# Patient Record
Sex: MALE | Race: WHITE | NOT HISPANIC OR LATINO | Employment: OTHER | URBAN - METROPOLITAN AREA
[De-identification: names, ages, dates, MRNs, and addresses within clinical notes are randomized per-mention and may not be internally consistent; named-entity substitution may affect disease eponyms.]

---

## 2017-02-13 DIAGNOSIS — M19.90 ARTHRITIS: ICD-10-CM

## 2017-02-13 RX ORDER — CELECOXIB 200 MG/1
CAPSULE ORAL
Qty: 90 CAPSULE | Refills: 1 | Status: SHIPPED | OUTPATIENT
Start: 2017-02-13 | End: 2017-08-28 | Stop reason: SDUPTHER

## 2017-02-15 RX ORDER — HYDROCHLOROTHIAZIDE 12.5 MG/1
TABLET ORAL
Qty: 90 TABLET | Refills: 1 | Status: SHIPPED | OUTPATIENT
Start: 2017-02-15 | End: 2017-08-09 | Stop reason: ALTCHOICE

## 2017-03-31 ENCOUNTER — TELEPHONE (OUTPATIENT)
Dept: FAMILY MEDICINE CLINIC | Facility: CLINIC | Age: 57
End: 2017-03-31

## 2017-03-31 NOTE — TELEPHONE ENCOUNTER
"Spoke with patient and informed him we had no documentation from [facility] and that he would need an appointment for any refills since he hasn;t been seen since 04/2016.  Patient states he is very upset, he knows those phone calls were made and its probably too late for medication refills anyways. He said there was nothing more to discuss and our conversation was done.    ----- Message from Jacob Collazo sent at 3/31/2017  9:18 AM EDT -----  [phone]    PT IS VERY AGGRAVATED, HIS EXACT WORDS WERE \"THIS IS TOTALLY UNACCEPTABLE!\"     [facility] TRIED CONTACTING US TWICE ON 3/10 & 3/15    PT HAS WAITED THREE WEEKS NOW ON A REFILL, HE SAID WE HAVE FAILED TO DO IT. HE'S ASKING IF SOMEONE CAN PLEASE CALL HIM BACK     "

## 2017-08-03 ENCOUNTER — TELEPHONE (OUTPATIENT)
Dept: PAIN MEDICINE | Facility: CLINIC | Age: 57
End: 2017-08-03

## 2017-08-03 NOTE — TELEPHONE ENCOUNTER
"07-, 09-: PRP Right L5-S1 and right S1 transforaminal epidural approach  04- Right L5-S1 and right S1 transforaminal epidural steroid   COPY FORWARD    Spoke to Mr. Munoz.  He underwent PRP L5-S1 in July and September 2016.  He states that he had slight relief of his back and leg pain but is unsure how long before he began to have regularly occurring pain.  He says he would ra  He describes the pain as escalating from 2-3/10 to 10/10 very quickly, and usually occurring about midday. Pain is located in the low back and right hip and _______ (lat,post, ant thigh/LE, foot) The pain is brought on by activity, and sitting will also make it worse.  He finds that lying flat will alleviate the pain, sometimes after a few minutes, sometimes it takes longer.  Usually he can make it til bedtime without another episode of severe pain.  He says that his Pilates program is what is keeping him going at this time.      He takes celebrex and uses dicofenac cream which he doesn't think helps.  He no longer takes the Keppra or Buspar because of side effect of \"knocking him out\".  He also sparingly uses 1/2 - 1 tablet  tizanidine at HS because of sedation side effect.  "

## 2017-08-09 ENCOUNTER — OFFICE VISIT (OUTPATIENT)
Dept: FAMILY MEDICINE CLINIC | Facility: CLINIC | Age: 57
End: 2017-08-09

## 2017-08-09 VITALS
BODY MASS INDEX: 27.69 KG/M2 | WEIGHT: 193 LBS | HEART RATE: 76 BPM | DIASTOLIC BLOOD PRESSURE: 108 MMHG | SYSTOLIC BLOOD PRESSURE: 138 MMHG | TEMPERATURE: 98.5 F

## 2017-08-09 DIAGNOSIS — I10 ESSENTIAL HYPERTENSION: Primary | ICD-10-CM

## 2017-08-09 LAB
ANION GAP SERPL CALCULATED.3IONS-SCNC: 10 MMOL/L (ref 3–11)
BUN BLD-MCNC: 20 MG/DL (ref 9–23)
BUN/CREAT SERPL: 25 (ref 7–25)
CALCIUM SPEC-SCNC: 9.5 MG/DL (ref 8.7–10.4)
CHLORIDE SERPL-SCNC: 101 MMOL/L (ref 99–109)
CO2 SERPL-SCNC: 26 MMOL/L (ref 20–31)
CREAT BLD-MCNC: 0.8 MG/DL (ref 0.6–1.3)
GFR SERPL CREATININE-BSD FRML MDRD: 100 ML/MIN/1.73
GLUCOSE BLD-MCNC: 106 MG/DL (ref 70–100)
POTASSIUM BLD-SCNC: 4.4 MMOL/L (ref 3.5–5.5)
SODIUM BLD-SCNC: 137 MMOL/L (ref 132–146)
TSH SERPL DL<=0.05 MIU/L-ACNC: 2.17 MIU/ML (ref 0.35–5.35)

## 2017-08-09 PROCEDURE — 36415 COLL VENOUS BLD VENIPUNCTURE: CPT | Performed by: FAMILY MEDICINE

## 2017-08-09 PROCEDURE — 80048 BASIC METABOLIC PNL TOTAL CA: CPT | Performed by: FAMILY MEDICINE

## 2017-08-09 PROCEDURE — 99213 OFFICE O/P EST LOW 20 MIN: CPT | Performed by: FAMILY MEDICINE

## 2017-08-09 PROCEDURE — 84443 ASSAY THYROID STIM HORMONE: CPT | Performed by: FAMILY MEDICINE

## 2017-08-09 RX ORDER — CHLORTHALIDONE 25 MG/1
25 TABLET ORAL DAILY
Qty: 90 TABLET | Refills: 3 | Status: SHIPPED | OUTPATIENT
Start: 2017-08-09 | End: 2017-08-14

## 2017-08-09 RX ORDER — CHLORTHALIDONE 25 MG/1
25 TABLET ORAL DAILY
Qty: 30 TABLET | Refills: 3 | Status: SHIPPED | OUTPATIENT
Start: 2017-08-09 | End: 2017-08-09 | Stop reason: SDUPTHER

## 2017-08-09 NOTE — PROGRESS NOTES
Subjective   Abel Munoz is a 57 y.o. male.     History of Present Illness   Living in [place].  Home blood pressure dystolic running high 90 to 100.    Back discomfort helped with exercise and using a .  Plays golf regularly. Still right leg pain but deals with it and avoids medication.  On Celebrex five years.  Some swelling of feet during day.  No muscle cramps.  Sleeps reasonably well.   The following portions of the patient's history were reviewed and updated as appropriate: allergies, current medications, past family history, past medical history, past social history, past surgical history and problem list.    Review of Systems   Constitutional: Negative.    HENT: Negative.    Eyes: Negative.    Respiratory: Negative.    Cardiovascular: Negative.    Gastrointestinal: Negative.    Endocrine: Negative.    Genitourinary: Negative.    Musculoskeletal: Negative.    Skin: Negative.    Allergic/Immunologic: Negative.    Neurological: Negative.    Hematological: Negative.    Psychiatric/Behavioral: Negative.        Objective   Physical Exam   Constitutional: He appears well-developed.   Neck: Neck supple. No thyromegaly present.   Cardiovascular: Regular rhythm and normal heart sounds.    Pulmonary/Chest: Effort normal and breath sounds normal.   Musculoskeletal: He exhibits no edema.   Neurological: He is alert.   Vitals reviewed.      Assessment/Plan   Abel was seen today for follow-up.    Diagnoses and all orders for this visit:    Essential hypertension  -     Discontinue: chlorthalidone (HYGROTON) 25 MG tablet; Take 1 tablet by mouth Daily.  -     chlorthalidone (HYGROTON) 25 MG tablet; Take 1 tablet by mouth Daily.  -     Basic Metabolic Panel  -     TSH    stop HCTZ, use chlorthalidone for swelling and better dystolic control.

## 2017-08-14 ENCOUNTER — OFFICE VISIT (OUTPATIENT)
Dept: PAIN MEDICINE | Facility: CLINIC | Age: 57
End: 2017-08-14

## 2017-08-14 VITALS
WEIGHT: 193 LBS | HEART RATE: 68 BPM | OXYGEN SATURATION: 99 % | BODY MASS INDEX: 27.63 KG/M2 | DIASTOLIC BLOOD PRESSURE: 83 MMHG | TEMPERATURE: 98 F | RESPIRATION RATE: 18 BRPM | HEIGHT: 70 IN | SYSTOLIC BLOOD PRESSURE: 126 MMHG

## 2017-08-14 DIAGNOSIS — M47.812 FACET ARTHROPATHY, CERVICAL: ICD-10-CM

## 2017-08-14 DIAGNOSIS — Z98.1 HISTORY OF FUSION OF CERVICAL SPINE: ICD-10-CM

## 2017-08-14 DIAGNOSIS — M51.26 LUMBAR DISC HERNIATION: ICD-10-CM

## 2017-08-14 DIAGNOSIS — M48.00 NEUROFORAMINAL STENOSIS OF SPINE: ICD-10-CM

## 2017-08-14 DIAGNOSIS — G47.00 INSOMNIA, UNSPECIFIED TYPE: ICD-10-CM

## 2017-08-14 DIAGNOSIS — M47.814 FACET ARTHROPATHY, THORACIC: ICD-10-CM

## 2017-08-14 DIAGNOSIS — M48.061 NEUROFORAMINAL STENOSIS OF LUMBAR SPINE: ICD-10-CM

## 2017-08-14 DIAGNOSIS — M47.26 OSTEOARTHRITIS OF SPINE WITH RADICULOPATHY, LUMBAR REGION: ICD-10-CM

## 2017-08-14 DIAGNOSIS — F41.9 ANXIETY: ICD-10-CM

## 2017-08-14 DIAGNOSIS — G62.9 PERIPHERAL POLYNEUROPATHY: ICD-10-CM

## 2017-08-14 DIAGNOSIS — M47.26 OSTEOARTHRITIS OF SPINE WITH RADICULOPATHY, LUMBAR REGION: Primary | ICD-10-CM

## 2017-08-14 PROCEDURE — 99214 OFFICE O/P EST MOD 30 MIN: CPT | Performed by: ANESTHESIOLOGY

## 2017-08-14 NOTE — PROGRESS NOTES
"Chief Complaint: \"I have pain in my right leg into the right foot. I don't have lower back pain.\"    Brief History: Mr. Munoz returns for evaluation of recurrent right lower extremity pain. He has a known history of right L5-S1 stenosis. He underwent platelet rich therapy right L5-S1 and right S1 transforaminal epidural approach on 07/06/2016, and experienced significant pain relief up to the point that he was almost pain-free.    On 09-, as per patient's request, he underwent PRP Right L5-S1 and right S1 transforaminal epidural approach. That was his last visit with me because of his winter relocation to [place]. Patient does not recall the amount of relief after his last procedure. Patient was able to play golf although with difficulties.    Pain level: 5/10.    Pain level ranges from 0-2/10 to 10/10 (at the end of the day)  Pain is localized from the right hip down to the right foot.   Patient denies numbness or weakness in the lower extremities, or any new bladder lower problems.   Patient has declined the possibility of surgical intervention.    In addition, patient is status post ACDF. He has atrophy of the left rhomboids and scapular winging. He denies any pain in his upper extremity except for pain in the left shoulder that at times radiates up to above the shoulder level.  He denies neck pain.  Patient is unable to identify factors that may increase or decrease his pain.  Patient denies difficulties with his balance.    Pharmacological measures: celocoxib 200 mg daily and tizanidine without side effects.    Patient continues Pilates and a home exercise program.   I have reviewed [facility]#[id number] consistent with medication reconciliation.    Diagnostic Studies  MRI lumbar spine, [facility] [place]: 03/04/2016: Severe right L5-S1 neural foraminal narrowing with impingement on the dorsal root ganglion. There is edema and enhancement of the exiting L5 nerve root. Right L5-S1 facet joint inflammation. " No canal stenosis.  X-rays of the thoracic spine 07/16/2015, revealed degenerative changes.  MRI of the lumbar spine without contrast revealed lateral disc bulge on the left creating severe narrowing of the left neural foramina and contact of the left nerve root. Mild retrolisthesis of L2 on L3. L1-L2 right lateral disc protrusion creating narrowing of the right neural foramina and contact of the right nerve root. L3-L4 broad-based disc bulge creating narrowing of the neural foramina bilaterally, left greater than right. Mild narrowing of the spinal canal. L4-L5 broad-based disc bulge creating narrowing of the neural foramina bilaterally. No significant spinal canal stenosis. L5-S1 broad-based disc bulge creating narrowing of the neural foramina bilaterally with no significant nerve root compression or contact.   EMG/NCV of the bilateral lower extremities revealed mild predominantly demyelinating sensorimotor peripheral neuropathy. Etiology unclear. Patient has history of heavy alcohol intake.  X-ray of the lumbar spine on 6/18/2014 obtained and read by Dr. En Burgos in the office, revealed multilevel spondylosis, mild scoliosis. The arms are not available for review.  MRI of the thoracic spine without contrast on 1/18/2007 revealed mild S tight thoracic scoliosis. Degenerative disc disease in the lower cervical spine, primarily at the C6-C7 level. Very mild degenerative changes in the thoracic spine. Small anterior spurs. Tiny posterior spur at T2-T3.  X-ray Cervical flexion and extension, 03/17/2015: Unremarkable findings.  MRI of the cervical spine without contrast on December 28, 2013 revealed cervical spondylosis without significant canal or neuroforaminal stenosis. C3-C4 small disc osteophyte complex without neural foraminal or central stenosis area. C4-C5 disc osteophyte complex formation asymmetrically greater on the right side, which does efface the right cord surface. Minimal narrowing of the neural  "foramina bilaterally. There is mild central canal stenosis. C5-C6 disc osteophyte complex formation, asymmetrically greater on the right, causing mild effacement of the ventral and right paracentral cord so surface, resulting in mild canal stenosis. C6-C7 vertebral body fusion. Posterior osteophyte formation which does abut and efface the left ventral cord surface. Neural foramina appear patent.    Review of Systems   Cardiovascular: Positive for leg swelling.   Musculoskeletal: Positive for arthralgias, back pain and gait problem.   All other systems reviewed and are negative.     The following portions of the patient's history were reviewed and updated as appropriate: problem list, past medical history, past surgery history, social history, family history, medications, and allergies     /83 (BP Location: Left arm, Patient Position: Sitting)  Pulse 68  Temp 98 °F (36.7 °C) (Temporal Artery )   Resp 18  Ht 70\" (177.8 cm)  Wt 193 lb (87.5 kg)  SpO2 99%  BMI 27.69 kg/m2     Physical Exam   Neurologic Exam   Constitutional: General appearance: No acute distress, well appearing and well nourished. Appears healthy, within normal limits of ideal weight, well hydrated and appearance reflects stated age.   Head and Face Normal. Palpation of the face and sinuses: No sinus tenderness.   Eyes Conjunctiva and lids: No erythema, swelling or discharge. Pupils and irises: Equal, round, reactive to light.   Neck: Supple, symmetric, trachea midline, no masses.   Pulmonary Respiratory effort: No increased work of breathing or signs of respiratory distress. Auscultation of lungs: Clear to auscultation.   Cardiovascular Auscultation of heart: Normal rate and rhythm, normal S1 and S2, no murmurs. Peripheral vascular exam: Normal. Carotid: right 2+, left 2+, no bruit on the right and no bruit on the left. Femoral: right 2+, left 2+, no bruit on the right and no bruit on the left. Posterior tibialis: right 2+ and left 2+. " Dorsalis pedis: right 2+ and left 2+. Abdominal Aorta: no abnormalities and no bruit heard. No pulse delay and no pulse discrepancy. Peripheral Pulses: full. Examination of extremities for edema and/or varicosities: Normal. No edema.   Abdomen Non-tender, no masses. Bowel sounds were normal. The abdomen was soft and nontender. No masses palpated.   Musculoskeletal Gait and station: Normal. Gait evaluation demonstrated a normal gait. Active range of motion of the cervical spine is remarkably improved. Extension, lateral flexion, rotation of the cervical spine did not increase or reproduce pain. Cervical facet joint loading maneuvers are negative at this time. Palpation of the left levator scapulae reveals trigger points.  This atrophy of the left rhomboids with scapular winging.  The range of motion of the glenohumeral joints is full and without pain. Rotator cuff strength is 5/5. Thoracic facet joint loading maneuvers are negative. The range of motion of the lumbar spine is full and without pain. Lumbar facet joint loading maneuvers are negative. Mo and Gaenslen's tests are negative. The range of motion of the hip joints is full and without pain. Piriformis maneuvers are negative. Muscle strength/tone: Normal. Motor Strength Findings: normal strength. Motor Tone: normal tone. Involuntary movements: none.   Skin and subcutaneous tissue: Normal without rashes or lesions.   Neurologic   Cranial nerves: Cranial nerves 2-12 intact.   Cortical function: Normal mental status.   Reflexes: 2+ and symmetric. Deep tendon reflexes: 0 right patella, 0 left patella, 0 right ankle jerk and 0 left ankle jerk2+ right biceps and 2+ left biceps. Superficial/Primitive Reflexes: primitive reflexes were absent. Spurling sign is negative. Lhermitte sign is negative. Straight leg raising test is negative. Femoral stretch sign is negative.   Sensation: No sensory loss. Sensory exam: intact to light touch, intact pain and temperature  sensation, intact vibration sensation and normal proprioception. There is decreased sensation to light touch distal to the knees. Vibration is impaired in the same distribution. Proprioception is intact.   Coordination: Normal finger to nose and heel to shin. Coordination: normal balance and negative Romberg's sign.   Psychiatric   Judgment and insight: Normal.   Orientation to person, place and time: Normal.   Recent and remote memory: Intact.   Mood and affect: Normal.     ASSESSMENT:   1. Osteoarthritis of spine with radiculopathy, lumbar region.  Right L5-S1    2. Lumbar disc herniation    3. Neuroforaminal stenosis of spine, right L5-S1    4. Facet arthropathy, cervical    5. History of fusion of cervical spine, ACDF C6-C7    6. Facet arthropathy, thoracic    7. Peripheral polyneuropathy    8. Insomnia, unspecified type    9. Anxiety      PLAN:   1. Referral to Dr. Ash Avila for neurosurgical consultation  2. Long-term rehabilitation efforts:  A. Continue Pilates  B. Continue TENS unit  3. Interventional pain management measures: I have advised the patient to postpone any procedures until he undergoes neurosurgical consultation.   4. Pharmacological measures: Patient has declined additional pharmacological measures  a. Continue celecoxib 200 mg daily, as currently prescribed  b. Trial with mannitol 10%, capsaicin 0.001%, lidocaine 10%, prilocaine 2%, imipramine 3% cream, apply 1 to 2 grams of cream to the affected areas every 4 to 6 hours as needed.  5. The patient has been instructed to contact my office with any questions or difficulties. The patient understands the plan and agrees to proceed accordingly.      Patient Care Team:  Chavo Tuttle MD as PCP - General  Chavo Tuttle MD as PCP - Family Medicine  Pepe Peña MD as Consulting Physician (Pain Medicine)       No orders of the defined types were placed in this encounter.        No future appointments.      Pepe GASTELUM  MD [place]

## 2017-08-18 ENCOUNTER — OFFICE VISIT (OUTPATIENT)
Dept: NEUROSURGERY | Facility: CLINIC | Age: 57
End: 2017-08-18

## 2017-08-18 VITALS — BODY MASS INDEX: 27.2 KG/M2 | WEIGHT: 190 LBS | TEMPERATURE: 97.3 F | HEIGHT: 70 IN

## 2017-08-18 DIAGNOSIS — M51.36 DDD (DEGENERATIVE DISC DISEASE), LUMBAR: Primary | ICD-10-CM

## 2017-08-18 DIAGNOSIS — M43.10 ACQUIRED SPONDYLOLISTHESIS: ICD-10-CM

## 2017-08-18 DIAGNOSIS — M54.16 LUMBAR RADICULOPATHY: ICD-10-CM

## 2017-08-18 DIAGNOSIS — M47.816 FACET ARTHROPATHY, LUMBAR: ICD-10-CM

## 2017-08-18 DIAGNOSIS — M51.36 BULGING LUMBAR DISC: ICD-10-CM

## 2017-08-18 PROCEDURE — 99243 OFF/OP CNSLTJ NEW/EST LOW 30: CPT | Performed by: NEUROLOGICAL SURGERY

## 2017-08-18 NOTE — PROGRESS NOTES
Patient: Abel Munoz  : 1960    Primary Care Provider: Chavo Tuttle MD    Requesting Provider: Dr. Pepe Peña        History    Chief Complaint: Back and right leg pain.    History of Present Illness: Mr. Munoz is a 57-year-old  who began having back difficulties in .  This was sustained for several months and he had a number of epidural injections done by Dr. Peña and improved.  Since  however his had bothersome pain in his right hip that extends into the side of the right calf.  This is often episodic.  He is worse with protracted sitting, playing golf, or sometimes with standing too much.  He gets up every day and doesn't series of stretches and exercises for an hour and a half.  Usually by the end of the day his symptoms are intolerable.  He has no left leg symptoms.  He denies bowel or bladder dysfunction.  Sometimes when at the beach he finds that he can't walk for any distance and has to look for a place to sit down.    Review of Systems   Constitutional: Negative for activity change, appetite change, chills, diaphoresis, fatigue, fever and unexpected weight change.   HENT: Negative for congestion, dental problem, drooling, ear discharge, ear pain, facial swelling, hearing loss, mouth sores, nosebleeds, postnasal drip, rhinorrhea, sinus pressure, sneezing, sore throat, tinnitus, trouble swallowing and voice change.    Eyes: Negative for photophobia, pain, discharge, redness, itching and visual disturbance.   Respiratory: Negative for apnea, cough, choking, chest tightness, shortness of breath, wheezing and stridor.    Cardiovascular: Negative for chest pain, palpitations and leg swelling.   Gastrointestinal: Positive for abdominal pain and nausea. Negative for abdominal distention, anal bleeding, blood in stool, constipation, diarrhea, rectal pain and vomiting.   Musculoskeletal: Positive for arthralgias, back pain and myalgias. Negative for gait problem, joint  "swelling, neck pain and neck stiffness.   Skin: Negative for color change, pallor, rash and wound.   Allergic/Immunologic: Negative for environmental allergies, food allergies and immunocompromised state.   Neurological: Positive for dizziness. Negative for tremors, seizures, syncope, facial asymmetry, speech difficulty, weakness, light-headedness, numbness and headaches.   Hematological: Negative for adenopathy. Does not bruise/bleed easily.   Psychiatric/Behavioral: Positive for decreased concentration. Negative for agitation, behavioral problems, confusion, dysphoric mood, self-injury, sleep disturbance and suicidal ideas. The patient is not nervous/anxious and is not hyperactive.        The patient's past medical history, past surgical history, family history, and social history have been reviewed at length in the electronic medical record.    Physical Exam:   Temp 97.3 °F (36.3 °C)  Ht 70\" (177.8 cm)  Wt 190 lb (86.2 kg)  BMI 27.26 kg/m2  CONSTITUTIONAL: Patient is well-nourished, pleasant and appears stated age.  CV: Heart regular rate and rhythm without murmur, rub, or gallop.  PULMONARY: Lungs are clear to ascultation.  MUSCULOSKELETAL:  Straight leg raising is negative.  Mo's Sign is negative.  ROM in back normal.  Tenderness in the back to palpation is not observed.  NEUROLOGICAL:  Orientation, memory, attention span, language function, and cognition have been examined and are intact.  Strength is intact in the lower extremities to direct testing.  Muscle tone is normal throughout.  Station and gait are normal.  Sensation is intact to light touch testing throughout.  Deep tendon reflexes are 1+ and symmetrical.  Coordination is intact.      Medical Decision Making    Data Review:   MRI of the lumbar spine dated 3/4/16 from the [facility] [place] [facility] demonstrates some mild diffuse degenerative disc disease and facet arthropathy.  There is a low-grade listhesis of L4 on L5.  I don't see any plain films but a " report suggests that flexion extension views demonstrated no instability in 2015.    Diagnosis:   1.  Lumbar degenerative disc disease.  2.  Lumbar degenerative joint disease.  3.  L4-5 spondylolisthesis.    Treatment Options:   Some of the symptoms that the patient harbors are probably mechanical.  However he has a very well-defined set of L5 symptoms on the right.  I recommended lumbar CT myelography with upright images to see if there is any compromise of the L5 nerve root.  Further recommendations will then ensue.       Diagnosis Plan   1. DDD (degenerative disc disease), lumbar     2. Lumbar radiculopathy     3. Facet arthropathy, lumbar     4. Bulging lumbar disc     5. Acquired spondylolisthesis             I, Dr. Avila, personally performed the services described in the documentation, as scribed in my presence, and it is both accurate and complete.  Scribed for Ash Avila MD by Preston Hanson CMA on 08/18/2017 at 8:42 AM

## 2017-08-28 DIAGNOSIS — M19.90 ARTHRITIS: ICD-10-CM

## 2017-08-29 ENCOUNTER — HOSPITAL ENCOUNTER (OUTPATIENT)
Dept: GENERAL RADIOLOGY | Facility: HOSPITAL | Age: 57
Discharge: HOME | End: 2017-08-29

## 2017-08-29 ENCOUNTER — HOSPITAL ENCOUNTER (OUTPATIENT)
Dept: CT IMAGING | Facility: HOSPITAL | Age: 57
Discharge: HOME | End: 2017-08-29

## 2017-08-29 ENCOUNTER — HOSPITAL ENCOUNTER (OUTPATIENT)
Dept: GENERAL RADIOLOGY | Facility: HOSPITAL | Age: 57
Discharge: HOME | End: 2017-08-29
Attending: NEUROLOGICAL SURGERY | Admitting: NEUROLOGICAL SURGERY

## 2017-08-29 VITALS
HEART RATE: 66 BPM | BODY MASS INDEX: 26.83 KG/M2 | WEIGHT: 187.4 LBS | SYSTOLIC BLOOD PRESSURE: 127 MMHG | OXYGEN SATURATION: 99 % | HEIGHT: 70 IN | RESPIRATION RATE: 18 BRPM | DIASTOLIC BLOOD PRESSURE: 87 MMHG | TEMPERATURE: 97.7 F

## 2017-08-29 DIAGNOSIS — M54.16 LUMBAR RADICULOPATHY: ICD-10-CM

## 2017-08-29 DIAGNOSIS — M54.16 ACUTE LUMBAR RADICULOPATHY: ICD-10-CM

## 2017-08-29 PROCEDURE — 72120 X-RAY BEND ONLY L-S SPINE: CPT

## 2017-08-29 PROCEDURE — 0 IOPAMIDOL 41 % SOLUTION: Performed by: NEUROLOGICAL SURGERY

## 2017-08-29 PROCEDURE — 72265 MYELOGRAPHY L-S SPINE: CPT

## 2017-08-29 PROCEDURE — 72131 CT LUMBAR SPINE W/O DYE: CPT

## 2017-08-29 RX ORDER — LIDOCAINE HYDROCHLORIDE 10 MG/ML
5 INJECTION, SOLUTION INFILTRATION; PERINEURAL ONCE
Status: COMPLETED | OUTPATIENT
Start: 2017-08-29 | End: 2017-08-29

## 2017-08-29 RX ORDER — ONDANSETRON 4 MG/1
4 TABLET, FILM COATED ORAL ONCE AS NEEDED
Status: DISCONTINUED | OUTPATIENT
Start: 2017-08-29 | End: 2017-08-30 | Stop reason: HOSPADM

## 2017-08-29 RX ORDER — PROMETHAZINE HYDROCHLORIDE 25 MG/1
25 TABLET ORAL ONCE AS NEEDED
Status: DISCONTINUED | OUTPATIENT
Start: 2017-08-29 | End: 2017-08-30 | Stop reason: HOSPADM

## 2017-08-29 RX ORDER — PROMETHAZINE HYDROCHLORIDE 25 MG/ML
25 INJECTION, SOLUTION INTRAMUSCULAR; INTRAVENOUS ONCE AS NEEDED
Status: DISCONTINUED | OUTPATIENT
Start: 2017-08-29 | End: 2017-08-30 | Stop reason: HOSPADM

## 2017-08-29 RX ORDER — ACETAMINOPHEN 500 MG
500 TABLET ORAL EVERY 4 HOURS PRN
Status: DISCONTINUED | OUTPATIENT
Start: 2017-08-29 | End: 2017-08-30 | Stop reason: HOSPADM

## 2017-08-29 RX ORDER — CELECOXIB 200 MG/1
CAPSULE ORAL
Qty: 90 CAPSULE | Refills: 1 | Status: SHIPPED | OUTPATIENT
Start: 2017-08-29 | End: 2018-02-11 | Stop reason: SDUPTHER

## 2017-08-29 RX ADMIN — LIDOCAINE HYDROCHLORIDE 5 ML: 10 INJECTION, SOLUTION INFILTRATION; PERINEURAL at 07:10

## 2017-08-29 RX ADMIN — IOPAMIDOL 20 ML: 408 INJECTION, SOLUTION INTRATHECAL at 07:15

## 2017-08-29 NOTE — PROGRESS NOTES
MYELOGRAM PROCEDURE NOTE  Neurosurgery    Patient: Abel Munoz  : 1960      PreOp Diagnosis: Lumbar radiculopathy    PostOp Diagnosis: Same    Procedure: Lumbar myelogram    Surgeon: [place]    Anesthesia: 1% lidocaine    Technique:   Spinal needle: 22 gauge   Contrast: Isovue 200 (20ml)   Injection site: R L3-4    EBL: Trace    Specimens removed: None    Complication: None        Ash Avila MD  17  7:07 AM

## 2017-08-29 NOTE — DISCHARGE INSTR - ACTIVITY
REST QUIETLY AT HOME TODAY.   LIE IN BED, ON A  COUCH, OR IN A RECLINER IN THE RECLINED POSITION.  YOU MAY RESUME LIGHT ACTIVITY TOMORROW.  YOU MAY SHOWER TOMORROW AND REMOVE THE BANDAID

## 2017-08-29 NOTE — NURSING NOTE
Given Discharge instructions and disc of xray films. Discharged to front entrance per wheelchair with friend driving.

## 2017-08-29 NOTE — PLAN OF CARE
Problem: Myelogram (Adult)  Goal: Signs and Symptoms of Listed Potential Problems Will be Absent or Manageable (Myelogram)  Outcome: Ongoing (interventions implemented as appropriate)

## 2017-08-29 NOTE — NURSING NOTE
Returned to room. Tolerated procedure well. Band aid clean, dry, and intact on mid lower back. HoB elev, Breakfast tray set up. Enc to drink lots of fluids.

## 2017-08-29 NOTE — DISCHARGE INSTR - DIET
RESUME YOUR USUAL DIET AND MEDICATIONS  DRINK AT LEAST 8 - 10 GLASSES OF FLUID PER DAY FOR THE NEXT FEW DAYS.

## 2017-08-30 ENCOUNTER — OFFICE VISIT (OUTPATIENT)
Dept: NEUROSURGERY | Facility: CLINIC | Age: 57
End: 2017-08-30

## 2017-08-30 VITALS — BODY MASS INDEX: 27.2 KG/M2 | TEMPERATURE: 97.7 F | WEIGHT: 190 LBS | HEIGHT: 70 IN

## 2017-08-30 DIAGNOSIS — M53.2X6 SPINAL INSTABILITY, LUMBAR: ICD-10-CM

## 2017-08-30 DIAGNOSIS — M54.16 LUMBAR RADICULOPATHY: Primary | ICD-10-CM

## 2017-08-30 DIAGNOSIS — M47.816 FACET ARTHROPATHY, LUMBAR: ICD-10-CM

## 2017-08-30 DIAGNOSIS — M43.10 ACQUIRED SPONDYLOLISTHESIS: ICD-10-CM

## 2017-08-30 DIAGNOSIS — M51.36 BULGING LUMBAR DISC: ICD-10-CM

## 2017-08-30 DIAGNOSIS — M51.36 DDD (DEGENERATIVE DISC DISEASE), LUMBAR: ICD-10-CM

## 2017-08-30 PROCEDURE — 99213 OFFICE O/P EST LOW 20 MIN: CPT | Performed by: NEUROLOGICAL SURGERY

## 2017-08-30 NOTE — PROGRESS NOTES
Patient: Abel Munoz  : 1960    Primary Care Provider: Chavo Tuttle MD    Requesting Provider: As above        History    Chief Complaint: Back and right leg pain.    History of Present Illness: Mr. Munoz is a 57-year-old  who began having back difficulties in .  This was sustained for several months and he had a number of epidural injections done by Dr. Peña and improved.  Since  however his had bothersome pain in his right hip that extends into the side of the right calf.  This is often episodic.  He is worse with protracted sitting, playing golf, or sometimes with standing too much.  He gets up every day and doesn't series of stretches and exercises for an hour and a half.  Usually by the end of the day his symptoms are intolerable.  He has no left leg symptoms.  He denies bowel or bladder dysfunction.  Sometimes when at the beach he finds that he can't walk for any distance and has to look for a place to sit down.    Review of Systems   Constitutional: Negative for activity change, appetite change, chills, diaphoresis, fatigue, fever and unexpected weight change.   HENT: Negative for congestion, dental problem, drooling, ear discharge, ear pain, facial swelling, hearing loss, mouth sores, nosebleeds, postnasal drip, rhinorrhea, sinus pressure, sneezing, sore throat, tinnitus, trouble swallowing and voice change.    Eyes: Negative for photophobia, pain, discharge, redness, itching and visual disturbance.   Respiratory: Negative for apnea, cough, choking, chest tightness, shortness of breath, wheezing and stridor.    Cardiovascular: Negative for chest pain, palpitations and leg swelling.   Gastrointestinal: Negative for abdominal distention, abdominal pain, anal bleeding, blood in stool, constipation, diarrhea, nausea, rectal pain and vomiting.   Endocrine: Negative for cold intolerance, heat intolerance, polydipsia, polyphagia and polyuria.   Genitourinary: Negative  "for decreased urine volume, difficulty urinating, dysuria, enuresis, flank pain, frequency, genital sores, hematuria and urgency.   Musculoskeletal: Positive for arthralgias and back pain. Negative for gait problem, joint swelling, myalgias, neck pain and neck stiffness.   Skin: Negative for color change, pallor, rash and wound.   Allergic/Immunologic: Negative for environmental allergies, food allergies and immunocompromised state.   Neurological: Negative for dizziness, tremors, seizures, syncope, facial asymmetry, speech difficulty, weakness, light-headedness, numbness and headaches.   Hematological: Negative for adenopathy. Does not bruise/bleed easily.   Psychiatric/Behavioral: Negative for agitation, behavioral problems, confusion, decreased concentration, dysphoric mood, hallucinations, self-injury, sleep disturbance and suicidal ideas. The patient is not nervous/anxious and is not hyperactive.    All other systems reviewed and are negative.      The patient's past medical history, past surgical history, family history, and social history have been reviewed at length in the electronic medical record.    Physical Exam:   Temp 97.7 °F (36.5 °C) (Temporal Artery )   Ht 70\" (177.8 cm)  Wt 190 lb (86.2 kg)  BMI 27.26 kg/m2  MUSCULOSKELETAL:  Straight leg raising is negative.  Mo's Sign is negative.  ROM in back normal.  Tenderness in the back to palpation is not observed.  NEUROLOGICAL:  Strength is intact in the lower extremities to direct testing.  Muscle tone is normal throughout.  Station and gait are normal.  Sensation is intact to light touch testing throughout.      Medical Decision Making    Data Review:   CT myelogram is reviewed.  The patient has a bit of \"twisting\" of his lumbar spine.  There is significant narrowing of the disc space at L2-3 particularly leftward where there is some lateral osteophyte.  There are chronic Modic endplate changes at this level.  At of the levels there is diffuse " facet arthropathy.  There is decreased nerve root filling on the right at L4-5 and L5-S1.  This is a mild finding.  There is however about 3 mm of cuca movement from flexion to extension at L4-5 on the dynamic radiographs.    Diagnosis:   1.  Lumbar radiculopathy.  2.  L4-5 spondylolisthesis with some instability  3.  Lumbar degenerative disc disease.    Treatment Options:   At great length I've reviewed the patient's films and my impressions with him.  If his symptoms are intolerable then I would consider bilateral decompression and fusion at L4-5 with a foraminotomy on the right at L5-S1.  The majority of his symptoms involving his hip and his leg.  His back is not as much of an issue.  His hip and leg symptoms suggest probable L5 nerve root involvement.  At great length I've explained the nature of this intervention as well as the potential risks, complications, and limitations.  He is headed to [place] for a few months.  He's going to continue his regimen of stretching and exercises.  If his symptoms do become intolerable and remain as they are now then he will follow-up with me and we will discuss the above-noted treatment measures.       Diagnosis Plan   1. Lumbar radiculopathy     2. DDD (degenerative disc disease), lumbar     3. Facet arthropathy, lumbar     4. Bulging lumbar disc     5. Acquired spondylolisthesis     6. Spinal instability, lumbar             I, Dr. Avila, personally performed the services described in the documentation, as scribed in my presence, and it is both accurate and complete.

## 2017-08-31 ENCOUNTER — TELEPHONE (OUTPATIENT)
Dept: PAIN MEDICINE | Facility: CLINIC | Age: 57
End: 2017-08-31

## 2017-09-06 ENCOUNTER — OUTSIDE FACILITY SERVICE (OUTPATIENT)
Dept: PAIN MEDICINE | Facility: CLINIC | Age: 57
End: 2017-09-06

## 2017-09-06 PROCEDURE — 64483 NJX AA&/STRD TFRM EPI L/S 1: CPT | Performed by: ANESTHESIOLOGY

## 2017-09-06 PROCEDURE — 64484 NJX AA&/STRD TFRM EPI L/S EA: CPT | Performed by: ANESTHESIOLOGY

## 2017-09-06 PROCEDURE — 99152 MOD SED SAME PHYS/QHP 5/>YRS: CPT | Performed by: ANESTHESIOLOGY

## 2017-09-18 ENCOUNTER — OUTSIDE FACILITY SERVICE (OUTPATIENT)
Dept: PAIN MEDICINE | Facility: CLINIC | Age: 57
End: 2017-09-18

## 2017-09-18 PROCEDURE — 64483 NJX AA&/STRD TFRM EPI L/S 1: CPT | Performed by: ANESTHESIOLOGY

## 2017-09-18 PROCEDURE — 99152 MOD SED SAME PHYS/QHP 5/>YRS: CPT | Performed by: ANESTHESIOLOGY

## 2017-09-18 PROCEDURE — 64484 NJX AA&/STRD TFRM EPI L/S EA: CPT | Performed by: ANESTHESIOLOGY

## 2017-09-18 RX ORDER — ME-TETRAHYDROFOLATE/B12/HRB236 1-1-500 MG
CAPSULE ORAL
Qty: 60 CAPSULE | Refills: 11 | Status: SHIPPED | OUTPATIENT
Start: 2017-09-18 | End: 2018-10-18

## 2017-09-18 RX ORDER — HYDROXYZINE HYDROCHLORIDE 25 MG/1
TABLET, FILM COATED ORAL
Qty: 30 TABLET | Refills: 0 | Status: SHIPPED | OUTPATIENT
Start: 2017-09-18 | End: 2018-10-18

## 2017-09-19 ENCOUNTER — TELEPHONE (OUTPATIENT)
Dept: PAIN MEDICINE | Facility: CLINIC | Age: 57
End: 2017-09-19

## 2017-09-19 NOTE — TELEPHONE ENCOUNTER
Spoke with pt. He stated other than achiness from injection site he is doing terrific and was so happy that Dr. Peña could work him in. His pain rating is a 0 out of 10. He states he is headed out for the winter but will let our office know if there is anything we can do. He thanked me again and we ended the call.

## 2018-02-11 DIAGNOSIS — M19.90 ARTHRITIS: ICD-10-CM

## 2018-02-12 RX ORDER — CELECOXIB 200 MG/1
CAPSULE ORAL
Qty: 90 CAPSULE | Refills: 0 | Status: SHIPPED | OUTPATIENT
Start: 2018-02-12 | End: 2018-05-03 | Stop reason: SDUPTHER

## 2018-05-03 DIAGNOSIS — M19.90 ARTHRITIS: ICD-10-CM

## 2018-05-04 RX ORDER — CELECOXIB 200 MG/1
CAPSULE ORAL
Qty: 90 CAPSULE | Refills: 0 | Status: SHIPPED | OUTPATIENT
Start: 2018-05-04 | End: 2018-10-18

## 2018-10-18 ENCOUNTER — OFFICE VISIT (OUTPATIENT)
Dept: FAMILY MEDICINE CLINIC | Facility: CLINIC | Age: 58
End: 2018-10-18

## 2018-10-18 VITALS
HEART RATE: 75 BPM | RESPIRATION RATE: 16 BRPM | SYSTOLIC BLOOD PRESSURE: 130 MMHG | WEIGHT: 196 LBS | DIASTOLIC BLOOD PRESSURE: 90 MMHG | OXYGEN SATURATION: 98 % | BODY MASS INDEX: 28.12 KG/M2 | TEMPERATURE: 98.2 F

## 2018-10-18 DIAGNOSIS — G44.209 ACUTE NON INTRACTABLE TENSION-TYPE HEADACHE: ICD-10-CM

## 2018-10-18 DIAGNOSIS — R11.0 NAUSEA: Primary | ICD-10-CM

## 2018-10-18 PROCEDURE — 99213 OFFICE O/P EST LOW 20 MIN: CPT | Performed by: FAMILY MEDICINE

## 2018-10-18 RX ORDER — ONDANSETRON 4 MG/1
4 TABLET, FILM COATED ORAL EVERY 8 HOURS PRN
Qty: 15 TABLET | Refills: 1 | Status: SHIPPED | OUTPATIENT
Start: 2018-10-18 | End: 2022-06-20

## 2018-10-18 RX ORDER — LISINOPRIL 10 MG/1
10 TABLET ORAL DAILY
COMMUNITY
End: 2022-06-20

## 2018-10-18 RX ORDER — BUTALBITAL, ASPIRIN, AND CAFFEINE 50; 325; 40 MG/1; MG/1; MG/1
1 CAPSULE ORAL EVERY 6 HOURS PRN
Qty: 40 CAPSULE | Refills: 0 | Status: SHIPPED | OUTPATIENT
Start: 2018-10-18 | End: 2022-06-20

## 2018-10-18 NOTE — PROGRESS NOTES
Subjective   Abel Munoz is a 58 y.o. male.     History of Present Illness   Cruise last week developed nausea, no vomiting.  Headache three days preventing sleep.  Headache improved with caffeine.  No diarrhea.  Appetite slow.  Not resting well. Took Pepto Bismol with some help and Tramadol that was ineffective.   The following portions of the patient's history were reviewed and updated as appropriate: allergies, current medications, past family history, past medical history, past social history, past surgical history and problem list.    Review of Systems   Constitutional: Negative for activity change.   Respiratory: Negative.    Cardiovascular: Negative.    Neurological: Positive for headaches.   Psychiatric/Behavioral: Positive for dysphoric mood and sleep disturbance.       Objective   Physical Exam   Constitutional: He is oriented to person, place, and time. He appears well-developed. No distress.   HENT:   Right Ear: External ear normal.   Left Ear: External ear normal.   Mouth/Throat: Oropharynx is clear and moist.   Eyes: Pupils are equal, round, and reactive to light.   Neck: Neck supple. No thyromegaly present.   Cardiovascular: Normal heart sounds.    Pulmonary/Chest: Breath sounds normal.   Abdominal: He exhibits no distension.   Lymphadenopathy:     He has no cervical adenopathy.   Neurological: He is alert and oriented to person, place, and time. He exhibits normal muscle tone.   Vitals reviewed.      Assessment/Plan   Abel was seen today for headache and nausea.    Diagnoses and all orders for this visit:    Nausea  -     ondansetron (ZOFRAN) 4 MG tablet; Take 1 tablet by mouth Every 8 (Eight) Hours As Needed for Nausea or Vomiting.    Acute non intractable tension-type headache  -     butalbital-aspirin-caffeine (FIORINAL) -40 MG per capsule; Take 1 capsule by mouth Every 6 (Six) Hours As Needed for Headache.

## 2019-09-11 ENCOUNTER — OFFICE VISIT (OUTPATIENT)
Dept: FAMILY MEDICINE CLINIC | Facility: CLINIC | Age: 59
End: 2019-09-11

## 2019-09-11 VITALS
RESPIRATION RATE: 20 BRPM | HEIGHT: 70 IN | TEMPERATURE: 97.6 F | OXYGEN SATURATION: 98 % | WEIGHT: 200 LBS | DIASTOLIC BLOOD PRESSURE: 80 MMHG | SYSTOLIC BLOOD PRESSURE: 120 MMHG | HEART RATE: 76 BPM | BODY MASS INDEX: 28.63 KG/M2

## 2019-09-11 DIAGNOSIS — M79.604 LOWER EXTREMITY PAIN, BILATERAL: Primary | ICD-10-CM

## 2019-09-11 DIAGNOSIS — M79.605 LOWER EXTREMITY PAIN, BILATERAL: Primary | ICD-10-CM

## 2019-09-11 LAB
ALBUMIN SERPL-MCNC: 4.8 G/DL (ref 3.5–5.2)
ALBUMIN/GLOB SERPL: 2 G/DL
ALP SERPL-CCNC: 42 U/L (ref 39–117)
ALT SERPL W P-5'-P-CCNC: 26 U/L (ref 1–41)
ANION GAP SERPL CALCULATED.3IONS-SCNC: 14.4 MMOL/L (ref 5–15)
AST SERPL-CCNC: 27 U/L (ref 1–40)
BASOPHILS # BLD AUTO: 0.05 10*3/MM3 (ref 0–0.2)
BASOPHILS NFR BLD AUTO: 0.9 % (ref 0–1.5)
BILIRUB SERPL-MCNC: 0.5 MG/DL (ref 0.2–1.2)
BUN BLD-MCNC: 22 MG/DL (ref 6–20)
BUN/CREAT SERPL: 25.3 (ref 7–25)
CALCIUM SPEC-SCNC: 9.5 MG/DL (ref 8.6–10.5)
CHLORIDE SERPL-SCNC: 100 MMOL/L (ref 98–107)
CK SERPL-CCNC: 331 U/L (ref 20–200)
CO2 SERPL-SCNC: 26.6 MMOL/L (ref 22–29)
CREAT BLD-MCNC: 0.87 MG/DL (ref 0.76–1.27)
DEPRECATED RDW RBC AUTO: 42.5 FL (ref 37–54)
EOSINOPHIL # BLD AUTO: 0.11 10*3/MM3 (ref 0–0.4)
EOSINOPHIL NFR BLD AUTO: 1.9 % (ref 0.3–6.2)
ERYTHROCYTE [DISTWIDTH] IN BLOOD BY AUTOMATED COUNT: 12.2 % (ref 12.3–15.4)
FERRITIN SERPL-MCNC: 287 NG/ML (ref 30–400)
GFR SERPL CREATININE-BSD FRML MDRD: 90 ML/MIN/1.73
GLOBULIN UR ELPH-MCNC: 2.4 GM/DL
GLUCOSE BLD-MCNC: 100 MG/DL (ref 65–99)
HCT VFR BLD AUTO: 41.2 % (ref 37.5–51)
HGB BLD-MCNC: 14.2 G/DL (ref 13–17.7)
IMM GRANULOCYTES # BLD AUTO: 0.01 10*3/MM3 (ref 0–0.05)
IMM GRANULOCYTES NFR BLD AUTO: 0.2 % (ref 0–0.5)
IRON 24H UR-MRATE: 76 MCG/DL (ref 59–158)
IRON SATN MFR SERPL: 25 % (ref 20–50)
LYMPHOCYTES # BLD AUTO: 1.51 10*3/MM3 (ref 0.7–3.1)
LYMPHOCYTES NFR BLD AUTO: 25.9 % (ref 19.6–45.3)
MAGNESIUM SERPL-MCNC: 2.2 MG/DL (ref 1.6–2.6)
MCH RBC QN AUTO: 32.7 PG (ref 26.6–33)
MCHC RBC AUTO-ENTMCNC: 34.5 G/DL (ref 31.5–35.7)
MCV RBC AUTO: 94.9 FL (ref 79–97)
MONOCYTES # BLD AUTO: 0.47 10*3/MM3 (ref 0.1–0.9)
MONOCYTES NFR BLD AUTO: 8.1 % (ref 5–12)
NEUTROPHILS # BLD AUTO: 3.68 10*3/MM3 (ref 1.7–7)
NEUTROPHILS NFR BLD AUTO: 63 % (ref 42.7–76)
NRBC BLD AUTO-RTO: 0 /100 WBC (ref 0–0.2)
PLATELET # BLD AUTO: 209 10*3/MM3 (ref 140–450)
PMV BLD AUTO: 10.3 FL (ref 6–12)
POTASSIUM BLD-SCNC: 4.6 MMOL/L (ref 3.5–5.2)
PROT SERPL-MCNC: 7.2 G/DL (ref 6–8.5)
RBC # BLD AUTO: 4.34 10*6/MM3 (ref 4.14–5.8)
SODIUM BLD-SCNC: 141 MMOL/L (ref 136–145)
TIBC SERPL-MCNC: 308 MCG/DL (ref 298–536)
TRANSFERRIN SERPL-MCNC: 207 MG/DL (ref 200–360)
TSH SERPL DL<=0.05 MIU/L-ACNC: 1.72 UIU/ML (ref 0.27–4.2)
WBC NRBC COR # BLD: 5.83 10*3/MM3 (ref 3.4–10.8)

## 2019-09-11 PROCEDURE — 82550 ASSAY OF CK (CPK): CPT | Performed by: FAMILY MEDICINE

## 2019-09-11 PROCEDURE — 85025 COMPLETE CBC W/AUTO DIFF WBC: CPT | Performed by: FAMILY MEDICINE

## 2019-09-11 PROCEDURE — 83735 ASSAY OF MAGNESIUM: CPT | Performed by: FAMILY MEDICINE

## 2019-09-11 PROCEDURE — 99213 OFFICE O/P EST LOW 20 MIN: CPT | Performed by: FAMILY MEDICINE

## 2019-09-11 PROCEDURE — 84443 ASSAY THYROID STIM HORMONE: CPT | Performed by: FAMILY MEDICINE

## 2019-09-11 PROCEDURE — 83540 ASSAY OF IRON: CPT | Performed by: FAMILY MEDICINE

## 2019-09-11 PROCEDURE — 84466 ASSAY OF TRANSFERRIN: CPT | Performed by: FAMILY MEDICINE

## 2019-09-11 PROCEDURE — 82728 ASSAY OF FERRITIN: CPT | Performed by: FAMILY MEDICINE

## 2019-09-11 PROCEDURE — 80053 COMPREHEN METABOLIC PANEL: CPT | Performed by: FAMILY MEDICINE

## 2019-09-11 RX ORDER — CELECOXIB 200 MG/1
200 CAPSULE ORAL DAILY
Refills: 0 | COMMUNITY
Start: 2019-06-05 | End: 2022-06-20

## 2019-09-11 NOTE — PROGRESS NOTES
Abel Munoz is a 59 y.o. male who presents today to establish care.    Chief Complaint   Patient presents with   • Establish Care   • leg cramps     since june2019        Patient having muscle spasm and cramping in left anterior lower leg in the night 3-4 times a week. He feels the tightness on both sides during the day when walking. He was diagnosed with mild neuropathy in 2013. Different than neuropathy feeling. Spasm last for 5 minutes, severe pain lasts for 30 seconds. Tightness is always there to varying degrees. He lives here in the summer and [place] in the winter. He is very active with palates and walking during golf. He aslo Egoscue method stretching that helps with core stretching and posture. He has tried stretching but no change. Symptoms are staying the same. Heating pads have not improved. Celebrex has not helped. Does not seem to get worse with activity. No change in level of activity. He had normal lab work done last winter at [facility] in [place]. No blood in stool, blood in urine, melena, or bleeding. Bowel movements normal. He has a healthy diet and drinks plenty of water and 2 powerade zeros a day. No change in diet.          Review of Systems   Respiratory: Negative for shortness of breath.    Cardiovascular: Negative for chest pain.   Gastrointestinal: Negative for blood in stool, constipation, diarrhea, nausea and vomiting.   Genitourinary: Negative for hematuria.   Musculoskeletal: Positive for myalgias. Negative for arthralgias and joint swelling.   Skin: Negative for rash.   Neurological: Negative for tremors, seizures, weakness and headache.        PHQ-9 Depression Screening  Little interest or pleasure in doing things?     Feeling down, depressed, or hopeless?     Trouble falling or staying asleep, or sleeping too much?     Feeling tired or having little energy?     Poor appetite or overeating?     Feeling bad about yourself - or that you are a failure or have let yourself or your  family down?     Trouble concentrating on things, such as reading the newspaper or watching television?     Moving or speaking so slowly that other people could have noticed? Or the opposite - being so fidgety or restless that you have been moving around a lot more than usual?     Thoughts that you would be better off dead, or of hurting yourself in some way?     PHQ-9 Total Score     If you checked off any problems, how difficult have these problems made it for you to do your work, take care of things at home, or get along with other people?         Past Medical History:   Diagnosis Date   • Acute serous otitis media    • Back pain     Upper Back   • Back spasm    • Cervical facet syndrome    • History of migraine headaches    • History of paronychia of finger    • History of pharyngitis    • History of serous otitis media    • History of upper respiratory infection    • Hypertension    • Lateral epicondylitis of left elbow    • Radicular pain of thoracic region    • Right shoulder pain    • Shin splint    • Stye    • Throat ulcer         Past Surgical History:   Procedure Laterality Date   • CERVICAL FUSION  2000    Dr. Sigala C6/7        Family History   Problem Relation Age of Onset   • Heart disease Mother    • Heart disease Father    • Dementia Maternal Grandmother    • No Known Problems Maternal Grandfather    • Dementia Paternal Grandmother    • No Known Problems Paternal Grandfather         Social History     Socioeconomic History   • Marital status:      Spouse name: Meghan   • Number of children: Not on file   • Years of education: Not on file   • Highest education level: Not on file   Tobacco Use   • Smoking status: Never Smoker   • Smokeless tobacco: Current User     Types: Snuff   Substance and Sexual Activity   • Alcohol use: Yes   • Drug use: No   • Sexual activity: Defer        Current Outpatient Medications on File Prior to Visit   Medication Sig Dispense Refill   •  "butalbital-aspirin-caffeine (FIORINAL) -40 MG per capsule Take 1 capsule by mouth Every 6 (Six) Hours As Needed for Headache. 40 capsule 0   • celecoxib (CeleBREX) 200 MG capsule Take 200 mg by mouth Daily.  0   • diclofenac (VOLTAREN) 1 % gel gel apply 2 grams to affected area four times a day  0   • lisinopril (PRINIVIL,ZESTRIL) 10 MG tablet Take 10 mg by mouth Daily.     • omeprazole (PriLOSEC) 20 MG capsule      • ondansetron (ZOFRAN) 4 MG tablet Take 1 tablet by mouth Every 8 (Eight) Hours As Needed for Nausea or Vomiting. 15 tablet 1   • VIAGRA 100 MG tablet TAKE AS DIRECTED 9 tablet 2   • zolpidem (AMBIEN) 10 MG tablet Take 1 tablet by mouth Every Night. 90 tablet 1     No current facility-administered medications on file prior to visit.        No Known Allergies     Visit Vitals  /80   Pulse 76   Temp 97.6 °F (36.4 °C)   Resp 20   Ht 177.8 cm (70\")   Wt 90.7 kg (200 lb)   SpO2 98%   BMI 28.70 kg/m²      Body mass index is 28.7 kg/m².    Physical Exam   Constitutional: He is oriented to person, place, and time. He appears well-developed and well-nourished. No distress.   HENT:   Head: Atraumatic.   Eyes: EOM are normal.   Neck: Normal range of motion. Neck supple.   Cardiovascular: Normal rate, regular rhythm, normal heart sounds and intact distal pulses. Exam reveals no gallop and no friction rub.   No murmur heard.  Pulmonary/Chest: Effort normal and breath sounds normal. No stridor. No respiratory distress. He has no wheezes. He has no rales.   Musculoskeletal: Normal range of motion. He exhibits no edema.        Right ankle: Normal. He exhibits normal range of motion, no swelling, no deformity and normal pulse. No tenderness. Achilles tendon exhibits no pain.        Left ankle: Normal. He exhibits normal range of motion, no swelling, no deformity and normal pulse. No tenderness. Achilles tendon exhibits no pain.        Right lower leg: Normal.        Left lower leg: Normal.        Right foot: " Normal.        Left foot: Normal.   Neurological: He is alert and oriented to person, place, and time.   Skin: Skin is warm and dry. He is not diaphoretic.   Psychiatric: He has a normal mood and affect. His behavior is normal.             Problems Addressed this Visit        Nervous and Auditory    Lower extremity pain, bilateral - Primary     Anterior lateral shin pain.  Left worse than right.  No abnormalities on exam.  Recommended patient begin taking magnesium supplementation.  Discussed differential diagnosis including shinsplints, electrolyte abnormalities, iron deficiency, peripheral vascular disease, medications, and worsening neuropathy.  Symptomology does not fit with shinsplints or claudication due to vascular abnormality.  Medications were reviewed and did not reveal source of cramping. recommended patient see neurologist if normal.  This but would like to take copies of his labs and see neurologist in [place] once he moved back down there for wintertime at the end of month.  He will follow-up as needed.         Relevant Orders    Comprehensive Metabolic Panel    CBC & Differential    Ferritin    Iron Profile    CK    Magnesium    TSH Rfx On Abnormal To Free T4    CBC Auto Differential          Return if symptoms worsen or fail to improve.    Parts of this office note have been dictated by voice recognition software. Grammatical and/or spelling errors may be present.     Chong Richardson MD  9/11/2019

## 2020-06-05 ENCOUNTER — OFFICE VISIT (OUTPATIENT)
Dept: FAMILY MEDICINE CLINIC | Facility: CLINIC | Age: 60
End: 2020-06-05

## 2020-06-05 VITALS
BODY MASS INDEX: 27.92 KG/M2 | TEMPERATURE: 98.6 F | HEIGHT: 70 IN | OXYGEN SATURATION: 97 % | WEIGHT: 195 LBS | HEART RATE: 75 BPM | RESPIRATION RATE: 20 BRPM | SYSTOLIC BLOOD PRESSURE: 138 MMHG | DIASTOLIC BLOOD PRESSURE: 78 MMHG

## 2020-06-05 DIAGNOSIS — H92.03 EAR PAIN, BILATERAL: Primary | ICD-10-CM

## 2020-06-05 DIAGNOSIS — M79.89 MASS OF SOFT TISSUE OF CHEST: ICD-10-CM

## 2020-06-05 PROBLEM — D17.1 LIPOMA OF TORSO: Status: ACTIVE | Noted: 2020-06-05

## 2020-06-05 PROBLEM — G47.22 SLEEP-WAKE SCHEDULE DISORDER, ADVANCED PHASE TYPE: Status: ACTIVE | Noted: 2018-12-02

## 2020-06-05 PROBLEM — K21.9 GASTROESOPHAGEAL REFLUX DISEASE: Status: ACTIVE | Noted: 2017-12-04

## 2020-06-05 PROCEDURE — 99213 OFFICE O/P EST LOW 20 MIN: CPT | Performed by: FAMILY MEDICINE

## 2020-06-05 RX ORDER — OMEPRAZOLE 20 MG/1
20 CAPSULE, DELAYED RELEASE ORAL
COMMUNITY
Start: 2017-12-04 | End: 2020-06-10 | Stop reason: SDUPTHER

## 2020-06-05 NOTE — PROGRESS NOTES
Abel Munoz is a 60 y.o. male who presents today for Mass (on breast bone. Discovered monday evening ) and Earache (in both ears. Symptom X1 month )      Earache    There is pain in both ears. This is a new problem. The current episode started more than 1 month ago (Started a month ago as itching. About 7-10 days ago began having pain and burning. If he rubs his ears it makes the pain come on and stay for an hour or more. ). The problem occurs constantly. The problem has been waxing and waning. There has been no fever. The pain is at a severity of 2/10. The pain is mild. Pertinent negatives include no abdominal pain, coughing, diarrhea, ear discharge, headaches, hearing loss, neck pain, rash, rhinorrhea, sore throat or vomiting. He has tried nothing (no new exposures. He thinks it may be sun exposure. ) for the symptoms. There is no history of a chronic ear infection, hearing loss or a tympanostomy tube.   Breast Mass   This is a new problem. The current episode started in the past 7 days (Noticed it on monday but unsure when it started). The problem has been resolved (No overlying skin abnormality, nontender, non mobile, and not hot to touch. ). Pertinent negatives include no abdominal pain, anorexia, arthralgias, change in bowel habit, chest pain, chills, congestion, coughing, diaphoresis, fatigue, fever, headaches, joint swelling, myalgias, nausea, neck pain, numbness, rash, sore throat, swollen glands, urinary symptoms, vertigo, visual change, vomiting or weakness.        Review of Systems   Constitutional: Negative for chills, diaphoresis, fatigue and fever.   HENT: Positive for ear pain. Negative for congestion, ear discharge, hearing loss, rhinorrhea, sore throat and swollen glands.    Respiratory: Negative for cough.    Cardiovascular: Negative for chest pain.   Gastrointestinal: Negative for abdominal pain, anorexia, change in bowel habit, diarrhea, nausea and vomiting.   Genitourinary: Positive for  "breast lump.   Musculoskeletal: Negative for arthralgias, joint swelling, myalgias and neck pain.   Skin: Negative for rash.   Neurological: Negative for vertigo, weakness and numbness.        The following portions of the patient's history were reviewed and updated as appropriate: allergies, current medications, past family history, past medical history, past social history, past surgical history and problem list.    Current Outpatient Medications on File Prior to Visit   Medication Sig Dispense Refill   • celecoxib (CeleBREX) 200 MG capsule Take 200 mg by mouth Daily.  0   • diclofenac (VOLTAREN) 1 % gel gel apply 2 grams to affected area four times a day  0   • lisinopril (PRINIVIL,ZESTRIL) 10 MG tablet Take 10 mg by mouth Daily.     • omeprazole (PriLOSEC) 20 MG capsule      • omeprazole (priLOSEC) 20 MG capsule Take 20 mg by mouth.     • VIAGRA 100 MG tablet TAKE AS DIRECTED 9 tablet 2   • zolpidem (AMBIEN) 10 MG tablet Take 1 tablet by mouth Every Night. 90 tablet 1   • butalbital-aspirin-caffeine (FIORINAL) -40 MG per capsule Take 1 capsule by mouth Every 6 (Six) Hours As Needed for Headache. 40 capsule 0   • ondansetron (ZOFRAN) 4 MG tablet Take 1 tablet by mouth Every 8 (Eight) Hours As Needed for Nausea or Vomiting. 15 tablet 1     No current facility-administered medications on file prior to visit.        No Known Allergies     Visit Vitals  /78 (BP Location: Right arm, Patient Position: Sitting, Cuff Size: Adult)   Pulse 75   Temp 98.6 °F (37 °C) (Temporal)   Resp 20   Ht 177.8 cm (70\")   Wt 88.5 kg (195 lb)   SpO2 97%   BMI 27.98 kg/m²        Physical Exam   Constitutional: He is oriented to person, place, and time. He appears well-developed and well-nourished. No distress.   HENT:   Head: Atraumatic.   Right Ear: Hearing, tympanic membrane, external ear and ear canal normal.   Left Ear: Hearing, tympanic membrane, external ear and ear canal normal.   Eyes: EOM are normal.   Neck: Normal " range of motion. Neck supple.   Cardiovascular: Normal rate, regular rhythm, normal heart sounds and intact distal pulses. Exam reveals no gallop and no friction rub.   No murmur heard.  Pulmonary/Chest: Effort normal and breath sounds normal. No respiratory distress. He has no wheezes. He has no rales.   Musculoskeletal: He exhibits no edema.   Neurological: He is alert and oriented to person, place, and time.   Skin: Skin is warm and dry. He is not diaphoretic.        Psychiatric: He has a normal mood and affect. His behavior is normal.             Problems Addressed this Visit        Nervous and Auditory    Ear pain, bilateral - Primary     No abnormality was noted during exam that could explain pain patient experiencing.  The itching sensation could come from dry skin on external ear.  Patient has very tanned skin from spending large amounts time in [place] in the outdoors.  He states he does wear sunscreen regularly.  Patient will continue to monitor symptoms and if they do not resolve or worsen he will contact us for referral to ENT for further evaluation and treatment.            Other    Mass of soft tissue of chest     Firm rubbery mobile mass over xiphoid process consistent with findings of lipoma.  Patient will continue to monitor at this time.  If he sees increase in size or change in nature of the mass he will return to clinic for referral for removal.  RTC precautions were given.               Return if symptoms worsen or fail to improve.    Parts of this office note have been dictated by voice recognition software. Grammatical and/or spelling errors may be present.    Chong Richardson MD   6/10/2020

## 2020-06-10 PROBLEM — H92.03 EAR PAIN, BILATERAL: Status: ACTIVE | Noted: 2020-06-10

## 2020-06-10 PROBLEM — M79.89 MASS OF SOFT TISSUE OF CHEST: Status: ACTIVE | Noted: 2020-06-10

## 2020-06-10 NOTE — ASSESSMENT & PLAN NOTE
Firm rubbery mobile mass over xiphoid process consistent with findings of lipoma.  Patient will continue to monitor at this time.  If he sees increase in size or change in nature of the mass he will return to clinic for referral for removal.  RTC precautions were given.  
No abnormality was noted during exam that could explain pain patient experiencing.  The itching sensation could come from dry skin on external ear.  Patient has very tanned skin from spending large amounts time in [place] in the outdoors.  He states he does wear sunscreen regularly.  Patient will continue to monitor symptoms and if they do not resolve or worsen he will contact us for referral to ENT for further evaluation and treatment.  
IV intact

## 2020-06-12 DIAGNOSIS — D17.1 LIPOMA OF TORSO: Primary | ICD-10-CM

## 2020-06-22 ENCOUNTER — HOSPITAL ENCOUNTER (OUTPATIENT)
Dept: ULTRASOUND IMAGING | Facility: HOSPITAL | Age: 60
Discharge: HOME | End: 2020-06-22
Admitting: FAMILY MEDICINE

## 2020-06-22 DIAGNOSIS — D17.1 LIPOMA OF TORSO: ICD-10-CM

## 2020-06-22 PROCEDURE — 76604 US EXAM CHEST: CPT

## 2020-06-23 ENCOUNTER — TELEPHONE (OUTPATIENT)
Dept: FAMILY MEDICINE CLINIC | Facility: CLINIC | Age: 60
End: 2020-06-23

## 2020-06-23 NOTE — TELEPHONE ENCOUNTER
----- Message from Chong Richardson MD sent at 6/23/2020  2:32 PM EDT -----  Please the patient know that the ultrasound of his chest showed a 1.2 cm isoechoic well-defined oval structure without pleural extension or bone involvement.  Findings are consistent with small lipoma.

## 2022-06-20 ENCOUNTER — OFFICE VISIT (OUTPATIENT)
Dept: FAMILY MEDICINE CLINIC | Facility: CLINIC | Age: 62
End: 2022-06-20

## 2022-06-20 ENCOUNTER — LAB (OUTPATIENT)
Dept: LAB | Facility: HOSPITAL | Age: 62
End: 2022-06-20

## 2022-06-20 VITALS
DIASTOLIC BLOOD PRESSURE: 80 MMHG | OXYGEN SATURATION: 99 % | HEIGHT: 70 IN | RESPIRATION RATE: 18 BRPM | HEART RATE: 76 BPM | WEIGHT: 194.2 LBS | TEMPERATURE: 98.6 F | BODY MASS INDEX: 27.8 KG/M2 | SYSTOLIC BLOOD PRESSURE: 134 MMHG

## 2022-06-20 DIAGNOSIS — J02.9 SORE THROAT: Primary | ICD-10-CM

## 2022-06-20 DIAGNOSIS — J06.9 VIRAL URI: ICD-10-CM

## 2022-06-20 LAB
EXPIRATION DATE: NORMAL
EXPIRATION DATE: NORMAL
FLUAV AG NPH QL: NEGATIVE
FLUBV AG NPH QL: NEGATIVE
INTERNAL CONTROL: NORMAL
INTERNAL CONTROL: NORMAL
Lab: NORMAL
Lab: NORMAL
S PYO AG THROAT QL: NEGATIVE

## 2022-06-20 PROCEDURE — 87804 INFLUENZA ASSAY W/OPTIC: CPT | Performed by: STUDENT IN AN ORGANIZED HEALTH CARE EDUCATION/TRAINING PROGRAM

## 2022-06-20 PROCEDURE — U0004 COV-19 TEST NON-CDC HGH THRU: HCPCS

## 2022-06-20 PROCEDURE — 87880 STREP A ASSAY W/OPTIC: CPT | Performed by: STUDENT IN AN ORGANIZED HEALTH CARE EDUCATION/TRAINING PROGRAM

## 2022-06-20 PROCEDURE — 99213 OFFICE O/P EST LOW 20 MIN: CPT | Performed by: STUDENT IN AN ORGANIZED HEALTH CARE EDUCATION/TRAINING PROGRAM

## 2022-06-20 RX ORDER — LISINOPRIL 20 MG/1
20 TABLET ORAL DAILY
COMMUNITY
Start: 2022-05-31

## 2022-06-20 RX ORDER — PREDNISONE 20 MG/1
TABLET ORAL
COMMUNITY
Start: 2022-03-24 | End: 2022-06-20

## 2022-06-20 RX ORDER — OMEPRAZOLE 40 MG/1
40 CAPSULE, DELAYED RELEASE ORAL DAILY
COMMUNITY
Start: 2022-06-14

## 2022-06-20 RX ORDER — GABAPENTIN 300 MG/1
300 CAPSULE ORAL
COMMUNITY
Start: 2022-05-31

## 2022-06-20 RX ORDER — BALOXAVIR MARBOXIL 80 MG/1
TABLET, FILM COATED ORAL
COMMUNITY
Start: 2022-06-18

## 2022-06-20 RX ORDER — BUPROPION HYDROCHLORIDE 300 MG/1
TABLET ORAL
COMMUNITY
Start: 2022-05-13

## 2022-06-20 RX ORDER — SULFAMETHOXAZOLE AND TRIMETHOPRIM 800; 160 MG/1; MG/1
TABLET ORAL
COMMUNITY
Start: 2022-05-16 | End: 2022-06-20

## 2022-06-20 NOTE — PROGRESS NOTES
"  Established Patient Office Visit      Subjective      Chief Complaint:  Fever (Patient just got back from a trip to [place], [place], since Wednesday has had fatigue, started a fever that night of 99.6, occasional cough, has a sore throat and earache, denies loss of taste or smell, denies body aches, joints hurt from coming off celebrex to \"give his stomach a break\" he said he just feels \"wiped out\")      History of Present Illness: Abel Munoz is a 62 y.o. male who presents for night sweats.     Patient with chills and fever started on Wednesday. At home COVID test negative x2. Gioven Xofluza 2 days ago after a televisit. No flu test yet.  No improvement in symptoms after the Xofluza.  No trouble breathing. + ear pain for 5 days. Some to right side. + mild cough with mild chest congestion.  Did recently travel injectable [place].  T-max 99.6 on Wednesday.  Denies loss taste smell.  Denies body aches.  He has some drenching night sweats last night so decided to come get evaluated in person.      Past Medical History:   Diagnosis Date   • Acute serous otitis media    • Back pain     Upper Back   • Back spasm    • Cervical facet syndrome    • GERD (gastroesophageal reflux disease) 2000   • History of migraine headaches    • History of paronychia of finger    • History of pharyngitis    • History of serous otitis media    • History of upper respiratory infection    • Lateral epicondylitis of left elbow    • Low back pain 2013    comes and goes   • Radicular pain of thoracic region    • Right shoulder pain    • Shin splint    • Stye        Patient Active Problem List   Diagnosis   • Neuroforaminal stenosis of spine, right L5-S1   • Facet arthropathy, cervical   • Facet arthropathy, thoracic   • Myofascial pain   • Glenohumeral arthritis   • Osteoarthritis of spine with radiculopathy, lumbar region.  Right L5-S1   • Anxiety   • Lumbar disc herniation   • Peripheral polyneuropathy   • History of fusion of " "cervical spine, ACDF C6-C7   • Insomnia   • Back muscle spasm   • Klippel-Feil sequence   • Essential hypertension   • Laryngeal ulceration   • Radiculopathy, cervical region   • Lower extremity pain, bilateral   • Gastroesophageal reflux disease   • Sleep-wake schedule disorder, advanced phase type   • Lipoma of torso   • Ear pain, bilateral   • Mass of soft tissue of chest         Current Outpatient Medications:   •  buPROPion XL (WELLBUTRIN XL) 300 MG 24 hr tablet, , Disp: , Rfl:   •  diclofenac (VOLTAREN) 1 % gel gel, apply 2 grams to affected area four times a day, Disp: , Rfl: 0  •  gabapentin (NEURONTIN) 300 MG capsule, Take 300 mg by mouth every night at bedtime., Disp: , Rfl:   •  lisinopril (PRINIVIL,ZESTRIL) 20 MG tablet, Take 20 mg by mouth Daily., Disp: , Rfl:   •  omeprazole (priLOSEC) 40 MG capsule, Take 40 mg by mouth Daily., Disp: , Rfl:   •  VIAGRA 100 MG tablet, TAKE AS DIRECTED, Disp: 9 tablet, Rfl: 2  •  Xofluza, 80 MG Dose, 1 x 80 MG tablet therapy pack, TAKE 1 TABLET BY MOUTH 1 TIME, Disp: , Rfl:   •  zolpidem (AMBIEN) 10 MG tablet, Take 1 tablet by mouth Every Night., Disp: 90 tablet, Rfl: 1      Objective     Physical Exam:   Vital Signs:   /80 (BP Location: Right arm, Patient Position: Sitting, Cuff Size: Adult)   Pulse 76   Temp 98.6 °F (37 °C) (Temporal)   Resp 18   Ht 177.8 cm (70\")   Wt 88.1 kg (194 lb 3.2 oz)   SpO2 99%   BMI 27.86 kg/m²      Physical Exam  Constitutional:       General: He is not in acute distress.     Appearance: He is not ill-appearing.   Cardiovascular:      Rate and Rhythm: Normal rate and regular rhythm.   Pulmonary:      Effort: Pulmonary effort is normal.      Breath sounds: Normal breath sounds.  No rales.  No egophony.  Neurological:      Mental Status: He is alert.   Psychiatric:         Thought Content: Thought content normal.   Oropharynx is erythematous with some mucus.  No exudate.  No tonsil hypertrophy.  TMs within normal limits " bilaterally.       Assessment / Plan      Assessment/Plan:   Diagnoses and all orders for this visit:    1. Sore throat (Primary)  -     POCT Influenza A/B  -     POCT rapid strep A    2. Viral URI  -     COVID-19 PCR, [facility], NP SWAB IN [facility] VIRAL TRANSPORT MEDIA/ORAL SWISH 24-30 HR TAT - Swab, Nasopharynx; Future      Symptoms seem most consistent with viral URI however he is having little bit of continued sweats.  I would expect him to be recovered by day 7-10 if not resolved by that point or worsening I instructed him to come back to the office.  Neck step may be considering chest x-ray.  No evidence of pneumonia today.    Hot tea and honey and Cepacol lozenge.  Isolate until COVID result    Follow Up:   No follow-ups on file.      MDM:     Darek Grant MD  Family Medicine - [facility] [place]

## 2022-06-21 LAB — SARS-COV-2 RNA NOSE QL NAA+PROBE: DETECTED

## 2023-06-13 ENCOUNTER — OFFICE VISIT (OUTPATIENT)
Dept: FAMILY MEDICINE CLINIC | Facility: CLINIC | Age: 63
End: 2023-06-13
Payer: COMMERCIAL

## 2023-06-13 VITALS
BODY MASS INDEX: 28.35 KG/M2 | TEMPERATURE: 98 F | HEART RATE: 73 BPM | WEIGHT: 198 LBS | HEIGHT: 70 IN | SYSTOLIC BLOOD PRESSURE: 120 MMHG | DIASTOLIC BLOOD PRESSURE: 80 MMHG

## 2023-06-13 DIAGNOSIS — K21.9 GASTROESOPHAGEAL REFLUX DISEASE, UNSPECIFIED WHETHER ESOPHAGITIS PRESENT: Primary | ICD-10-CM

## 2023-06-13 DIAGNOSIS — R10.13 EPIGASTRIC PAIN: ICD-10-CM

## 2023-06-13 PROCEDURE — 99213 OFFICE O/P EST LOW 20 MIN: CPT | Performed by: FAMILY MEDICINE

## 2023-06-13 RX ORDER — LISINOPRIL 40 MG/1
40 TABLET ORAL DAILY
COMMUNITY
Start: 2023-01-24

## 2023-06-13 RX ORDER — ROSUVASTATIN CALCIUM 10 MG/1
10 TABLET, COATED ORAL DAILY
COMMUNITY
Start: 2023-02-09

## 2023-06-13 RX ORDER — CELECOXIB 200 MG/1
200 CAPSULE ORAL DAILY
COMMUNITY

## 2023-06-13 NOTE — PROGRESS NOTES
"Abel Munoz is a 63 y.o. male who presents today for GI Problem      Patient has history of diverticulitis in 2013 or 2014. April 21st patient has a member member event in [place] and did not eat anything out of the ordinary but that night got a since of feeling \"wiped out\" and feeling fatigued. He got nausea that has been coming and going since that time. He stopped celebrex, stopped bourbon, and has been following GERD diet. Sunday and Monday he started to get feeling of fatigue, wooziness, and nausea. He has been struggling with constipation. He has been taking miralax which has helped but it comes back when he stops taking it. He has not had vomiting. He tried drinking more water and power aide but this did not help. He takes omeprazole for GERD and has had to double up on this for the last few days which helped. Today he feels completely back to normal.     Abdominal Pain  This is a chronic problem. The current episode started more than 1 month ago. The onset quality is sudden. The problem occurs daily. The problem has been waxing and waning. The pain is located in the epigastric region. The pain is at a severity of 7/10. The quality of the pain is aching. The abdominal pain radiates to the epigastric region. Associated symptoms include constipation, headaches and nausea. Pertinent negatives include no anorexia, arthralgias, belching, diarrhea, dysuria, fever, flatus, frequency, hematochezia, hematuria, melena, myalgias, vomiting or weight loss. The pain is relieved by Eating.      Review of Systems   Constitutional:  Positive for fatigue. Negative for chills, diaphoresis, fever and unexpected weight loss.   Respiratory:  Negative for shortness of breath and wheezing.    Cardiovascular:  Negative for chest pain and palpitations.   Gastrointestinal:  Positive for abdominal pain, constipation, nausea and GERD. Negative for abdominal distention, anal bleeding, anorexia, blood in stool, diarrhea, flatus, " "hematochezia, melena and vomiting.   Genitourinary:  Negative for dysuria, frequency and hematuria.   Musculoskeletal:  Negative for arthralgias and myalgias.   Neurological:  Positive for headache (mild resolved wiht aspirin).      The following portions of the patient's history were reviewed and updated as appropriate: allergies, current medications, past family history, past medical history, past social history, past surgical history and problem list.    Current Outpatient Medications on File Prior to Visit   Medication Sig Dispense Refill    buPROPion XL (WELLBUTRIN XL) 300 MG 24 hr tablet       celecoxib (CeleBREX) 200 MG capsule Take 1 capsule by mouth Daily.      diclofenac (VOLTAREN) 1 % gel gel apply 2 grams to affected area four times a day  0    gabapentin (NEURONTIN) 300 MG capsule Take 1 capsule by mouth every night at bedtime.      lisinopril (PRINIVIL,ZESTRIL) 40 MG tablet Take 1 tablet by mouth Daily.      omeprazole (priLOSEC) 40 MG capsule Take 1 capsule by mouth Daily.      rosuvastatin (CRESTOR) 10 MG tablet Take 1 tablet by mouth Daily.      VIAGRA 100 MG tablet TAKE AS DIRECTED 9 tablet 2    zolpidem (AMBIEN) 10 MG tablet Take 1 tablet by mouth Every Night. 90 tablet 1    [DISCONTINUED] lisinopril (PRINIVIL,ZESTRIL) 20 MG tablet Take 20 mg by mouth Daily.      [DISCONTINUED] Xofluza, 80 MG Dose, 1 x 80 MG tablet therapy pack TAKE 1 TABLET BY MOUTH 1 TIME       No current facility-administered medications on file prior to visit.       No Known Allergies     Visit Vitals  /80   Pulse 73   Temp 98 °F (36.7 °C)   Ht 177.8 cm (70\")   Wt 89.8 kg (198 lb)   BMI 28.41 kg/m²        Physical Exam  Constitutional:       General: He is not in acute distress.     Appearance: He is well-developed. He is not diaphoretic.   HENT:      Head: Atraumatic.   Cardiovascular:      Rate and Rhythm: Normal rate and regular rhythm.      Heart sounds: Normal heart sounds. No murmur heard.    No friction rub. No " gallop.   Pulmonary:      Effort: Pulmonary effort is normal. No respiratory distress.      Breath sounds: Normal breath sounds. No stridor. No wheezing, rhonchi or rales.   Abdominal:      General: Bowel sounds are normal. There is no distension.      Palpations: Abdomen is soft. There is no mass.      Tenderness: There is no abdominal tenderness. There is no guarding or rebound.      Hernia: No hernia (Diastases recti) is present.   Musculoskeletal:      Cervical back: Normal range of motion and neck supple.   Skin:     General: Skin is warm and dry.   Neurological:      Mental Status: He is alert and oriented to person, place, and time.   Psychiatric:         Behavior: Behavior normal.        Results for orders placed or performed in visit on 06/20/22   COVID-19 PCR, [facility], NP SWAB IN [facility] VIRAL TRANSPORT MEDIA/ORAL SWISH 24-30 HR TAT - Swab, Nasopharynx    Specimen: Nasopharynx; Swab   Result Value Ref Range    SARS-CoV-2 DAR Detected (C) Not Detected        Problems Addressed this Visit          Gastrointestinal Abdominal     Gastroesophageal reflux disease - Primary     Abdominal pain and other symptoms have resolved at this time.  Pain could have been secondary to worsening GERD versus pancreatitis versus diverticulitis versus patient versus other causes.  Patient will continue current medications.  He will continue to follow GERD specific diet.  Patient was given referral to gastroenterology for further evaluation and treatment.  RTC/ED precautions given.         Relevant Orders    Ambulatory Referral to Gastroenterology    Epigastric pain    Relevant Orders    Ambulatory Referral to Gastroenterology     Diagnoses         Codes Comments    Gastroesophageal reflux disease, unspecified whether esophagitis present    -  Primary ICD-10-CM: K21.9  ICD-9-CM: 530.81     Epigastric pain     ICD-10-CM: R10.13  ICD-9-CM: 789.06             Return if symptoms worsen or fail to improve.    24 minutes was spent on  this patient encounter which included history taking, physical exam, answering patient questions, counseling, discussing treatment plan, placing orders, and documentation.    Chong Richardson MD   6/13/2023

## 2023-06-13 NOTE — ASSESSMENT & PLAN NOTE
Abdominal pain and other symptoms have resolved at this time.  Pain could have been secondary to worsening GERD versus pancreatitis versus diverticulitis versus patient versus other causes.  Patient will continue current medications.  He will continue to follow GERD specific diet.  Patient was given referral to gastroenterology for further evaluation and treatment.  RTC/ED precautions given.

## 2023-06-14 ENCOUNTER — OFFICE VISIT (OUTPATIENT)
Dept: FAMILY MEDICINE CLINIC | Facility: CLINIC | Age: 63
End: 2023-06-14
Payer: COMMERCIAL

## 2023-06-14 ENCOUNTER — LAB (OUTPATIENT)
Dept: LAB | Facility: HOSPITAL | Age: 63
End: 2023-06-14
Payer: COMMERCIAL

## 2023-06-14 ENCOUNTER — HOSPITAL ENCOUNTER (OUTPATIENT)
Dept: CT IMAGING | Facility: HOSPITAL | Age: 63
Discharge: HOME | End: 2023-06-14
Payer: COMMERCIAL

## 2023-06-14 VITALS
HEART RATE: 84 BPM | DIASTOLIC BLOOD PRESSURE: 90 MMHG | BODY MASS INDEX: 28.63 KG/M2 | HEIGHT: 70 IN | SYSTOLIC BLOOD PRESSURE: 130 MMHG | TEMPERATURE: 97 F | WEIGHT: 200 LBS

## 2023-06-14 DIAGNOSIS — R10.11 RUQ PAIN: ICD-10-CM

## 2023-06-14 DIAGNOSIS — R10.13 EPIGASTRIC PAIN: ICD-10-CM

## 2023-06-14 DIAGNOSIS — R10.84 GENERALIZED ABDOMINAL PAIN: ICD-10-CM

## 2023-06-14 DIAGNOSIS — R10.84 GENERALIZED ABDOMINAL PAIN: Primary | ICD-10-CM

## 2023-06-14 LAB
ALBUMIN SERPL-MCNC: 4.8 G/DL (ref 3.5–5.2)
ALBUMIN/GLOB SERPL: 2.5 G/DL
ALP SERPL-CCNC: 45 U/L (ref 39–117)
ALT SERPL W P-5'-P-CCNC: 34 U/L (ref 1–41)
ANION GAP SERPL CALCULATED.3IONS-SCNC: 11.6 MMOL/L (ref 5–15)
AST SERPL-CCNC: 31 U/L (ref 1–40)
BASOPHILS # BLD AUTO: 0.04 10*3/MM3 (ref 0–0.2)
BASOPHILS NFR BLD AUTO: 0.6 % (ref 0–1.5)
BILIRUB SERPL-MCNC: 0.5 MG/DL (ref 0–1.2)
BUN SERPL-MCNC: 26 MG/DL (ref 8–23)
BUN/CREAT SERPL: 25.5 (ref 7–25)
CALCIUM SPEC-SCNC: 9.5 MG/DL (ref 8.6–10.5)
CHLORIDE SERPL-SCNC: 105 MMOL/L (ref 98–107)
CO2 SERPL-SCNC: 24.4 MMOL/L (ref 22–29)
CREAT SERPL-MCNC: 1.02 MG/DL (ref 0.76–1.27)
DEPRECATED RDW RBC AUTO: 41.7 FL (ref 37–54)
EGFRCR SERPLBLD CKD-EPI 2021: 82.6 ML/MIN/1.73
EOSINOPHIL # BLD AUTO: 0.06 10*3/MM3 (ref 0–0.4)
EOSINOPHIL NFR BLD AUTO: 1 % (ref 0.3–6.2)
ERYTHROCYTE [DISTWIDTH] IN BLOOD BY AUTOMATED COUNT: 12.3 % (ref 12.3–15.4)
GLOBULIN UR ELPH-MCNC: 1.9 GM/DL
GLUCOSE SERPL-MCNC: 75 MG/DL (ref 65–99)
HCT VFR BLD AUTO: 40.7 % (ref 37.5–51)
HGB BLD-MCNC: 14 G/DL (ref 13–17.7)
IMM GRANULOCYTES # BLD AUTO: 0.02 10*3/MM3 (ref 0–0.05)
IMM GRANULOCYTES NFR BLD AUTO: 0.3 % (ref 0–0.5)
LIPASE SERPL-CCNC: 30 U/L (ref 13–60)
LYMPHOCYTES # BLD AUTO: 1.56 10*3/MM3 (ref 0.7–3.1)
LYMPHOCYTES NFR BLD AUTO: 25.3 % (ref 19.6–45.3)
MCH RBC QN AUTO: 32 PG (ref 26.6–33)
MCHC RBC AUTO-ENTMCNC: 34.4 G/DL (ref 31.5–35.7)
MCV RBC AUTO: 93.1 FL (ref 79–97)
MONOCYTES # BLD AUTO: 0.54 10*3/MM3 (ref 0.1–0.9)
MONOCYTES NFR BLD AUTO: 8.8 % (ref 5–12)
NEUTROPHILS NFR BLD AUTO: 3.94 10*3/MM3 (ref 1.7–7)
NEUTROPHILS NFR BLD AUTO: 64 % (ref 42.7–76)
NRBC BLD AUTO-RTO: 0 /100 WBC (ref 0–0.2)
PLATELET # BLD AUTO: 196 10*3/MM3 (ref 140–450)
PMV BLD AUTO: 10.4 FL (ref 6–12)
POTASSIUM SERPL-SCNC: 4.2 MMOL/L (ref 3.5–5.2)
PROT SERPL-MCNC: 6.7 G/DL (ref 6–8.5)
RBC # BLD AUTO: 4.37 10*6/MM3 (ref 4.14–5.8)
SODIUM SERPL-SCNC: 141 MMOL/L (ref 136–145)
WBC NRBC COR # BLD: 6.16 10*3/MM3 (ref 3.4–10.8)

## 2023-06-14 PROCEDURE — 99214 OFFICE O/P EST MOD 30 MIN: CPT | Performed by: FAMILY MEDICINE

## 2023-06-14 PROCEDURE — 83690 ASSAY OF LIPASE: CPT

## 2023-06-14 PROCEDURE — 80053 COMPREHEN METABOLIC PANEL: CPT

## 2023-06-14 PROCEDURE — 85025 COMPLETE CBC W/AUTO DIFF WBC: CPT

## 2023-06-14 PROCEDURE — 83013 H PYLORI (C-13) BREATH: CPT | Performed by: FAMILY MEDICINE

## 2023-06-14 PROCEDURE — 36415 COLL VENOUS BLD VENIPUNCTURE: CPT

## 2023-06-14 PROCEDURE — 74176 CT ABD & PELVIS W/O CONTRAST: CPT

## 2023-06-14 NOTE — ASSESSMENT & PLAN NOTE
Patient was having generalized abdominal pain over the last month.  Pain had resolved yesterday when he was seen in the office but came back overnight.  He is now having pain more in the epigastric area and right upper quadrant.  He is not tender to palpation at this time.  Patient is also been feeling fatigued but has not had fever or chills.  He has nausea but no vomiting.  He has felt a little woozy as well.  Patient has chronic constipation and takes MiraLAX as needed.  Differential diagnosis remains broad at this time and includes but is not limited to diverticulitis, cholecystitis, pancreatitis, GERD, and peptic ulcer. CMP, lipase, and H. pylori breath test ordered.  Stat CT of abdomen pelvis was ordered as well.  RTC/ED precautions given.

## 2023-06-14 NOTE — PROGRESS NOTES
"Abel Munoz is a 63 y.o. male who presents today for Rib Pain and GI Problem      Patient woke up this morning around 3 am with return of epigastric abdominal pain worse in the RUQ. He has also had nausea. He states its a \"prickly pain\". He states this is the same way he felt last week. He went to the golf club and played 9 holes. He is a little woozy and tired. He has not had any vomiting. Nausea resolved after eating but pain persisted. He has not noticed anything that makes the pain worse or better. He has not had changes is in his diet and has not been eating greasy or fatty food.        Review of Systems   Constitutional:  Positive for fatigue. Negative for fever and unexpected weight loss.   HENT:  Negative for congestion, ear pain and sore throat.    Eyes:  Negative for visual disturbance.   Respiratory:  Negative for cough, shortness of breath and wheezing.    Cardiovascular:  Negative for chest pain and palpitations.   Gastrointestinal:  Positive for abdominal pain, constipation and nausea. Negative for blood in stool, diarrhea, vomiting and GERD.   Endocrine: Negative for polydipsia and polyuria.   Genitourinary:  Negative for difficulty urinating.   Musculoskeletal:  Negative for joint swelling.   Skin:  Negative for rash and skin lesions.   Allergic/Immunologic: Negative for environmental allergies.   Neurological:  Positive for light-headedness. Negative for seizures and syncope.   Hematological:  Does not bruise/bleed easily.   Psychiatric/Behavioral:  Negative for suicidal ideas.       The following portions of the patient's history were reviewed and updated as appropriate: allergies, current medications, past family history, past medical history, past social history, past surgical history and problem list.    Current Outpatient Medications on File Prior to Visit   Medication Sig Dispense Refill    buPROPion XL (WELLBUTRIN XL) 300 MG 24 hr tablet       celecoxib (CeleBREX) 200 MG capsule Take 1 " "capsule by mouth Daily.      diclofenac (VOLTAREN) 1 % gel gel apply 2 grams to affected area four times a day  0    gabapentin (NEURONTIN) 300 MG capsule Take 1 capsule by mouth every night at bedtime.      lisinopril (PRINIVIL,ZESTRIL) 40 MG tablet Take 1 tablet by mouth Daily.      omeprazole (priLOSEC) 40 MG capsule Take 1 capsule by mouth Daily.      rosuvastatin (CRESTOR) 10 MG tablet Take 1 tablet by mouth Daily.      VIAGRA 100 MG tablet TAKE AS DIRECTED 9 tablet 2    zolpidem (AMBIEN) 10 MG tablet Take 1 tablet by mouth Every Night. 90 tablet 1     No current facility-administered medications on file prior to visit.       No Known Allergies     Visit Vitals  /90   Pulse 84   Temp 97 °F (36.1 °C)   Ht 177.8 cm (70\")   Wt 90.7 kg (200 lb)   BMI 28.70 kg/m²        Physical Exam  Constitutional:       General: He is not in acute distress.     Appearance: He is well-developed. He is not diaphoretic.   HENT:      Head: Atraumatic.   Cardiovascular:      Rate and Rhythm: Normal rate and regular rhythm.      Heart sounds: Normal heart sounds. No murmur heard.    No friction rub. No gallop.   Pulmonary:      Effort: Pulmonary effort is normal. No respiratory distress.      Breath sounds: Normal breath sounds. No stridor. No wheezing, rhonchi or rales.   Abdominal:      General: Bowel sounds are normal. There is no distension.      Palpations: Abdomen is soft. There is no mass.      Tenderness: There is no abdominal tenderness. There is no guarding or rebound.      Hernia: No hernia is present.   Musculoskeletal:      Cervical back: Normal range of motion and neck supple.   Skin:     General: Skin is warm and dry.   Neurological:      Mental Status: He is alert and oriented to person, place, and time.   Psychiatric:         Behavior: Behavior normal.        Results for orders placed or performed in visit on 06/20/22   COVID-19 PCR, [facility], NP SWAB IN [facility]AL TRANSPORT MEDIA/ORAL SWISH 24-30 HR TAT - " Swab, Nasopharynx    Specimen: Nasopharynx; Swab   Result Value Ref Range    SARS-CoV-2 DAR Detected (C) Not Detected        Problems Addressed this Visit          Gastrointestinal Abdominal     Epigastric pain    Relevant Orders    Comprehensive Metabolic Panel    CBC & Differential    Lipase    H. Pylori Breath Test - Breath, Lung    CT Abdomen Pelvis Without Contrast    RUQ pain    Relevant Orders    Comprehensive Metabolic Panel    CBC & Differential    Lipase    CT Abdomen Pelvis Without Contrast    Generalized abdominal pain - Primary     Patient was having generalized abdominal pain over the last month.  Pain had resolved yesterday when he was seen in the office but came back overnight.  He is now having pain more in the epigastric area and right upper quadrant.  He is not tender to palpation at this time.  Patient is also been feeling fatigued but has not had fever or chills.  He has nausea but no vomiting.  He has felt a little woozy as well.  Patient has chronic constipation and takes MiraLAX as needed.  Differential diagnosis remains broad at this time and includes but is not limited to diverticulitis, cholecystitis, pancreatitis, GERD, and peptic ulcer. CMP, lipase, and H. pylori breath test ordered.  Stat CT of abdomen pelvis was ordered as well.  RTC/ED precautions given.         Relevant Orders    Comprehensive Metabolic Panel    CBC & Differential    Lipase    H. Pylori Breath Test - Breath, Lung    CT Abdomen Pelvis Without Contrast     Diagnoses         Codes Comments    Generalized abdominal pain    -  Primary ICD-10-CM: R10.84  ICD-9-CM: 789.07     Epigastric pain     ICD-10-CM: R10.13  ICD-9-CM: 789.06     RUQ pain     ICD-10-CM: R10.11  ICD-9-CM: 789.01             Return if symptoms worsen or fail to improve.    Chong Richardson MD   6/14/2023

## 2023-06-16 LAB — UREA BREATH TEST QL: NEGATIVE

## 2023-06-23 ENCOUNTER — TELEPHONE (OUTPATIENT)
Dept: FAMILY MEDICINE CLINIC | Facility: CLINIC | Age: 63
End: 2023-06-23

## 2023-06-23 NOTE — TELEPHONE ENCOUNTER
Caller: Abel Munoz    Relationship: Self    Best call back number: [phone]    What is the best time to reach you: ANY TIME    Who are you requesting to speak with (clinical staff, provider,  specific staff member): DR RAMIREZ    Do you know the name of the person who called: SELF    What was the call regarding: PATIENT HAS YET TO HEAR FROM GASTROENTEROLOGY AND WAS INSTRUCTED TO CALL OFFICE IF HE HAS NOT BEEN CONTACTED. PLEASE ADVISE

## 2023-07-19 PROBLEM — R10.9 ACUTE LEFT FLANK PAIN: Status: ACTIVE | Noted: 2023-07-19

## 2023-07-19 PROBLEM — R07.9 CHEST PAIN: Status: ACTIVE | Noted: 2023-07-19

## 2023-07-19 PROBLEM — M54.6 ACUTE LEFT-SIDED THORACIC BACK PAIN: Status: ACTIVE | Noted: 2023-07-19

## 2023-08-23 NOTE — TELEPHONE ENCOUNTER
Caller: Alexander Abelquinn Barrios    Relationship: Self    Best call back number: [phone]     Requested Prescriptions:   Requested Prescriptions     Pending Prescriptions Disp Refills    gabapentin (NEURONTIN) 300 MG capsule       Sig: Take 1 capsule by mouth every night at bedtime.        Pharmacy where request should be sent: [id number] [place] [id number]  AT [facility] - [phone] [facility] [phone]      Last office visit with prescribing clinician: 6/14/2023   Last telemedicine visit with prescribing clinician: Visit date not found   Next office visit with prescribing clinician: Visit date not found     Additional details provided by patient: PATIENT HAS CALLED REQUESTING A NEW PRESCRIPTION ON ABOVE MEDICATION. PATIENT IS REQUESTING A 90 DAY SUPPLY IF POSSIBLE.    Does the patient have less than a 3 day supply:  [x] Yes  [] No    Would you like a call back once the refill request has been completed: [] Yes [x] No    If the office needs to give you a call back, can they leave a voicemail: [] Yes [x] No    Marimar Garg   08/23/23 12:19 EDT

## 2023-08-23 NOTE — TELEPHONE ENCOUNTER
Rx Refill Note  Requested Prescriptions     Pending Prescriptions Disp Refills    gabapentin (NEURONTIN) 300 MG capsule       Sig: Take 1 capsule by mouth every night at bedtime.      Last office visit with prescribing clinician: 6/14/2023   Last telemedicine visit with prescribing clinician: Visit date not found   Next office visit with prescribing clinician: Visit date not found                         Would you like a call back once the refill request has been completed: [] Yes [] No    If the office needs to give you a call back, can they leave a voicemail: [] Yes [] No    Genoveva Mann MA  08/23/23, 12:46 EDT

## 2023-08-24 RX ORDER — GABAPENTIN 300 MG/1
300 CAPSULE ORAL
OUTPATIENT
Start: 2023-08-24

## 2023-08-24 NOTE — TELEPHONE ENCOUNTER
Spoke to patient and advised of 's  message. Patient voiced understanding and was sent to  to schedule appointment.

## 2023-08-24 NOTE — TELEPHONE ENCOUNTER
This is a controlled substance and patient will need a visit in the office to sign a controlled substance agreement and obtain a UDS if he wishes to continue this medication as it has not been prescribed by our office in the past.

## 2023-10-29 NOTE — ASSESSMENT & PLAN NOTE
Anterior lateral shin pain.  Left worse than right.  No abnormalities on exam.  Recommended patient begin taking magnesium supplementation.  Discussed differential diagnosis including shinsplints, electrolyte abnormalities, iron deficiency, peripheral vascular disease, medications, and worsening neuropathy.  Symptomology does not fit with shinsplints or claudication due to vascular abnormality.  Medications were reviewed and did not reveal source of cramping. recommended patient see neurologist if normal.  This but would like to take copies of his labs and see neurologist in [place] once he moved back down there for wintertime at the end of month.  He will follow-up as needed.   Male

## 2024-07-09 ENCOUNTER — OFFICE VISIT (OUTPATIENT)
Dept: FAMILY MEDICINE CLINIC | Facility: CLINIC | Age: 64
End: 2024-07-09
Payer: COMMERCIAL

## 2024-07-09 VITALS
HEIGHT: 70 IN | HEART RATE: 73 BPM | TEMPERATURE: 98.6 F | BODY MASS INDEX: 27.92 KG/M2 | OXYGEN SATURATION: 97 % | RESPIRATION RATE: 18 BRPM | SYSTOLIC BLOOD PRESSURE: 116 MMHG | WEIGHT: 195 LBS | DIASTOLIC BLOOD PRESSURE: 66 MMHG

## 2024-07-09 DIAGNOSIS — T46.6X5A STATIN MYOPATHY: Primary | ICD-10-CM

## 2024-07-09 DIAGNOSIS — G72.0 STATIN MYOPATHY: Primary | ICD-10-CM

## 2024-07-09 DIAGNOSIS — R10.11 RUQ PAIN: ICD-10-CM

## 2024-07-09 PROCEDURE — 99214 OFFICE O/P EST MOD 30 MIN: CPT | Performed by: STUDENT IN AN ORGANIZED HEALTH CARE EDUCATION/TRAINING PROGRAM

## 2024-07-09 NOTE — PROGRESS NOTES
Established Patient Office Visit        Subjective      Chief Complaint:  Abdominal Pain (Pt has pain under right rib cage that comes and goes started last summer. )      History of Present Illness: Abel Munoz is a 64 y.o. male who presents for pain to the RUQ pain. Can be after eating or can be random. Will start sometimes less than 5 minutes after eating. 5-6 days at a time it will come and go but can last from 30 minutes to the three hours. Pain described as colicing throbbing that comes and goes. Nausea at 1 am for about 1 month. Present several times per week. No trouble swallowing no reflux no weightloss. No fever. Feels well.     Had statin myopathy some and some residual achiness to the lower legs    Gastritis on egd path normal   Us at [place] normal no stones RUQ u/s 12/7/23     CT Chest Without Contrast Diagnostic  Order: [id number]  Impression      Stable sub-6 mm pulmonary nodules are most likely benign. No further imaging follow-up is recommended.    Findings of prior granulomatous disease as described.  Narrative    This result has an attachment that is not available.  EXAM: CT CHEST WITHOUT IV CONTRAST    HISTORY: Follow-up pulmonary nodule    TECHNIQUE: CT chest was obtained without intravenous contrast utilizing HRCT technique. Sagittal reformats were performed.      COMPARISON: CT chest 10/24/2023.    FINDINGS:    Medical Devices: None.    Lungs and Airways: Patent central airways. Mild bronchial wall thickening. Stable biapical scarring. Redemonstrated multiple upper lobe predominant solid pulmonary nodules measuring up to 5 mm. For reference, the largest pulmonary nodule measures 5 mm  (2/89), similar to prior. No new pulmonary nodule Scattered small calcified granulomas. Sub-6 mm intrafissural lymph nodes. No new suspicious noncalcified pulmonary nodule or focal consolidation.    Pleural Space: No pleural effusion.    Mediastinum and Sharri: Calcified mediastinal, right hilar lymph nodes  are again seen. No intrathoracic lymphadenopathy. Esophagus is decompressed.    Heart and Pericardium: The cardiac chambers are normal in size.  Small amount of calcified plaque in the coronary arteries. No pericardial effusion.    Aorta: Normal caliber thoracic aorta. Small amount of calcified atherosclerotic plaque.    Pulmonary Artery: Mildly dilated main pulmonary artery measuring up to 3 cm, which can be seen with pulmonary hypertension in proper clinical setting.      Osseous Structures and Chest Wall: Mild degenerative changes in visualized bones. No aggressive osseous lesions. Fusion of C6-7 vertebral bodies, likely congenital.    Upper Abdomen: Unchanged tiny calcified hepatic and splenic granulomas.  Exam End: 01/23/24 08:35 Last Resulted: 01/23/24 09:42   Received From: [facility]  Result Received: 07/09/24 10:01     Patient Active Problem List   Diagnosis    Neuroforaminal stenosis of spine, right L5-S1    Facet arthropathy, cervical    Facet arthropathy, thoracic    Myofascial pain    Glenohumeral arthritis    Osteoarthritis of spine with radiculopathy, lumbar region.  Right L5-S1    Anxiety    Lumbar disc herniation    Peripheral polyneuropathy    History of fusion of cervical spine, ACDF C6-C7    Insomnia    Back muscle spasm    Klippel-Feil sequence    Essential hypertension    Laryngeal ulceration    Radiculopathy, cervical region    Lower extremity pain, bilateral    Gastroesophageal reflux disease    Sleep-wake schedule disorder, advanced phase type    Lipoma of torso    Ear pain, bilateral    Mass of soft tissue of chest    Epigastric pain    RUQ pain    Generalized abdominal pain    Acute left-sided thoracic back pain    Chest pain    Acute left flank pain    Statin myopathy         Current Outpatient Medications:     celecoxib (CeleBREX) 200 MG capsule, Take 1 capsule by mouth Daily., Disp: , Rfl:     diclofenac (VOLTAREN) 1 % gel gel, apply 2 grams to affected area four times a day, Disp: ,  "Rfl: 0    gabapentin (NEURONTIN) 300 MG capsule, Take 2 capsules by mouth every night at bedtime., Disp: , Rfl:     lisinopril (PRINIVIL,ZESTRIL) 40 MG tablet, Take 1 tablet by mouth Daily., Disp: , Rfl:     omeprazole (priLOSEC) 20 MG capsule, Take 1 capsule by mouth 2 (Two) Times a Day., Disp: 180 capsule, Rfl: 2    VIAGRA 100 MG tablet, TAKE AS DIRECTED, Disp: 9 tablet, Rfl: 2    zolpidem (AMBIEN) 10 MG tablet, Take 1 tablet by mouth Every Night., Disp: 90 tablet, Rfl: 1    buPROPion XL (WELLBUTRIN XL) 300 MG 24 hr tablet, , Disp: , Rfl:        Objective     Physical Exam:   Vital Signs:   /66   Pulse 73   Temp 98.6 °F (37 °C) (Temporal)   Resp 18   Ht 177.8 cm (70\")   Wt 88.5 kg (195 lb)   SpO2 97%   BMI 27.98 kg/m²      Physical Exam  Constitutional:       General: He is not in acute distress.     Appearance: He is not ill-appearing.   Cardiovascular:      Rate and Rhythm: Normal rate and regular rhythm.   Pulmonary:      Effort: Pulmonary effort is normal.      Breath sounds: Normal breath sounds.   Neurological:      Mental Status: He is alert.   Psychiatric:         Thought Content: Thought content normal.   Point tenderness just inferior to the right ribs in the right upper quadrant negative Gold sign pain is worse with abdominal wall flexion         Assessment / Plan      Assessment/Plan:   Diagnoses and all orders for this visit:    1. Statin myopathy (Primary)    2. RUQ pain       Likely abd wall pain call if worsening consider CT if worsening.  If it is more related to eating we will get a HIDA scan.  He had pretty extensive workup and I do not feel like further workup is required at this time.    Okay to try ibuprofen as needed.  Do not take the same day as Celebrex    Follow Up:   No follow-ups on file.    MDM: Had statin myopathy I recommend staying off of this no need to recheck cholesterol in my opinion at this time as he has no desire to restart any medication for cholesterol.  Data " review    Darek Grant MD  Family Medicine - [place] [facility] [place]

## 2024-09-16 ENCOUNTER — OFFICE VISIT (OUTPATIENT)
Dept: FAMILY MEDICINE CLINIC | Facility: CLINIC | Age: 64
End: 2024-09-16
Payer: COMMERCIAL

## 2024-09-16 VITALS
HEIGHT: 70 IN | BODY MASS INDEX: 28.35 KG/M2 | SYSTOLIC BLOOD PRESSURE: 110 MMHG | WEIGHT: 198 LBS | DIASTOLIC BLOOD PRESSURE: 78 MMHG | HEART RATE: 68 BPM | TEMPERATURE: 98 F | OXYGEN SATURATION: 98 %

## 2024-09-16 DIAGNOSIS — K21.9 GASTROESOPHAGEAL REFLUX DISEASE, UNSPECIFIED WHETHER ESOPHAGITIS PRESENT: Primary | ICD-10-CM

## 2024-09-16 DIAGNOSIS — R53.83 OTHER FATIGUE: ICD-10-CM

## 2024-09-16 LAB
EXPIRATION DATE: NORMAL
INTERNAL CONTROL: NORMAL
Lab: NORMAL
SARS-COV-2 AG UPPER RESP QL IA.RAPID: NOT DETECTED

## 2024-09-16 PROCEDURE — 87426 SARSCOV CORONAVIRUS AG IA: CPT | Performed by: FAMILY MEDICINE

## 2024-09-16 PROCEDURE — 99214 OFFICE O/P EST MOD 30 MIN: CPT | Performed by: FAMILY MEDICINE

## 2025-05-21 ENCOUNTER — OFFICE VISIT (OUTPATIENT)
Dept: FAMILY MEDICINE CLINIC | Facility: CLINIC | Age: 65
End: 2025-05-21
Payer: COMMERCIAL

## 2025-05-21 VITALS
WEIGHT: 203.4 LBS | TEMPERATURE: 98.6 F | SYSTOLIC BLOOD PRESSURE: 116 MMHG | OXYGEN SATURATION: 97 % | DIASTOLIC BLOOD PRESSURE: 84 MMHG | HEART RATE: 80 BPM | BODY MASS INDEX: 29.18 KG/M2 | RESPIRATION RATE: 17 BRPM

## 2025-05-21 DIAGNOSIS — E78.5 HYPERLIPIDEMIA, UNSPECIFIED HYPERLIPIDEMIA TYPE: ICD-10-CM

## 2025-05-21 DIAGNOSIS — T46.6X5A STATIN MYOPATHY: ICD-10-CM

## 2025-05-21 DIAGNOSIS — G72.0 STATIN MYOPATHY: ICD-10-CM

## 2025-05-21 DIAGNOSIS — G62.9 PERIPHERAL POLYNEUROPATHY: Primary | ICD-10-CM

## 2025-05-21 DIAGNOSIS — R26.9 GAIT ABNORMALITY: ICD-10-CM

## 2025-05-21 RX ORDER — DULOXETIN HYDROCHLORIDE 30 MG/1
30 CAPSULE, DELAYED RELEASE ORAL DAILY
COMMUNITY
Start: 2025-03-11

## 2025-05-21 NOTE — ASSESSMENT & PLAN NOTE
Hyperlipidemia with statin intolerance due to severe myalgias.  Coronary calcium score of 104 per records  Most recent lipid panel completed in March 2025 with LDL in 126  Family history and statin side effects warrant consideration of PCSK9 inhibitor.  Referral to cardiology as requested today.  Will also begin Repatha today.

## 2025-05-21 NOTE — PROGRESS NOTES
Follow Up Office Visit      Patient Name: Able Munoz  : 1960   MRN: [id number]     Chief Complaint:    Chief Complaint   Patient presents with    Statin myopathy     F/u       History of Present Illness  The patient presents for evaluation of statin myopathy and gait imbalance/peripheral neuropathy.    Patient splits his time both here in [place] and in [place] during the winter.  While in [place], he is followed closely by his PCP, CURRY Segura.  He states it was recommended that he be referred to both cardiology and neurology, though was unable to be seen in a timely manner before he moved back to [place], and is requesting referral today.    Patient has a known history of polyneuropathy.  He reports a sensation of heaviness and tightness in his lower extremities, particularly below the knees.  This has been a longstanding issue and patient feels that is secondary to previous history of statin myopathy.  He states he has undergone multiple EMG/nerve conduction studies.  He states that his recently led to gait changes.  He is interested in referral to neurology for further evaluation of this.    Patient has longstanding history of hyperlipidemia.  Previously experienced severe muscle aches from statins, leading to discontinuation.  He is also intolerant to Zetia.  Per records, patient was seen by cardiology in 2024 where he trialed rosuvastatin 10 mg daily with side effects of severe lower extremity muscle cramps and fatigue.  At that time he did have normal CK levels.  Per records, cardiology did state due to his myalgias they did strongly prefer to avoid retrialing any statin medications.  Trial of Zetia was completed with close monitoring, though patient experience recurrent myalgias.  Patient is interested in trying PCSK9 inhibitor such as Repatha and is interested in referral to cardiology to discuss this further.          Subjective      I have reviewed and the  following portions of the patient's history were updated as appropriate: past family history, past medical history, past social history, past surgical history and problem list.    Medications:     Current Outpatient Medications:     celecoxib (CeleBREX) 200 MG capsule, Take 1 capsule by mouth Daily., Disp: , Rfl:     DULoxetine (CYMBALTA) 30 MG capsule, Take 1 capsule by mouth Daily., Disp: , Rfl:     gabapentin (NEURONTIN) 300 MG capsule, Take 2 capsules by mouth every night at bedtime., Disp: , Rfl:     lisinopril (PRINIVIL,ZESTRIL) 40 MG tablet, Take 1 tablet by mouth Daily., Disp: , Rfl:     omeprazole (priLOSEC) 20 MG capsule, Take 1 capsule by mouth 2 (Two) Times a Day., Disp: 180 capsule, Rfl: 2    VIAGRA 100 MG tablet, TAKE AS DIRECTED, Disp: 9 tablet, Rfl: 2    zolpidem (AMBIEN) 10 MG tablet, Take 1 tablet by mouth Every Night., Disp: 90 tablet, Rfl: 1    diclofenac (VOLTAREN) 1 % gel gel, apply 2 grams to affected area four times a day (Patient not taking: Reported on 5/21/2025), Disp: , Rfl: 0    Evolocumab (REPATHA) solution auto-injector SureClick injection, Inject 1 mL under the skin into the appropriate area as directed Every 14 (Fourteen) Days., Disp: 6 mL, Rfl: 0    Allergies:   Allergies   Allergen Reactions    Crestor [Rosuvastatin] Myalgia    Ondansetron Hcl GI Intolerance     constipation       Objective     Physical Exam:   Physical Exam  Constitutional:       General: He is not in acute distress.     Appearance: He is not ill-appearing.   HENT:      Head: Normocephalic.   Neck:      Vascular: No carotid bruit.   Cardiovascular:      Rate and Rhythm: Normal rate and regular rhythm.      Heart sounds: No murmur heard.  Pulmonary:      Effort: Pulmonary effort is normal. No respiratory distress.      Breath sounds: Normal breath sounds.   Musculoskeletal:         General: Normal range of motion.   Skin:     General: Skin is warm.   Neurological:      General: No focal deficit present.      Mental  Status: He is alert.   Psychiatric:         Mood and Affect: Mood normal.         Vital Signs:   Vitals:    05/21/25 0914   BP: 116/84   BP Location: Left arm   Patient Position: Sitting   Cuff Size: Adult   Pulse: 80   Resp: 17   Temp: 98.6 °F (37 °C)   TempSrc: Infrared   SpO2: 97%   Weight: 92.3 kg (203 lb 6.4 oz)     Body mass index is 29.18 kg/m².         Procedures    Assessment / Plan         Assessment and Plan     Diagnoses and all orders for this visit:    1. Peripheral polyneuropathy (Primary)  Assessment & Plan:  Chronic neuropathy with numbness, tingling, muscle tightness, and cramps.  Continue current regimen.  Will refer to neurology as requested.    Orders:  -     Ambulatory Referral to Neurology    2. Gait abnormality  -     Ambulatory Referral to Neurology    3. Statin myopathy    4. Hyperlipidemia, unspecified hyperlipidemia type  Assessment & Plan:  Hyperlipidemia with statin intolerance due to severe myalgias.  Coronary calcium score of 104 per records  Most recent lipid panel completed in March 2025 with LDL in 126  Family history and statin side effects warrant consideration of PCSK9 inhibitor.  Referral to cardiology as requested today.  Will also begin Repatha today.         Orders:  -     Ambulatory Referral to Cardiology  -     Evolocumab (REPATHA) solution auto-injector SureClick injection; Inject 1 mL under the skin into the appropriate area as directed Every 14 (Fourteen) Days.  Dispense: 6 mL; Refill: 0           Follow Up:   Return in about 3 months (around 8/21/2025) for Neuropathy, hyperlipidemia.    Patient or patient representative verbalized consent for the use of Ambient Listening during the visit with  Sandie Mensah PA-C for chart documentation. 5/21/2025  09:27 EDT    Sandie Mensah PA-C    [facility]

## 2025-05-21 NOTE — ASSESSMENT & PLAN NOTE
Chronic neuropathy with numbness, tingling, muscle tightness, and cramps.  Continue current regimen.  Will refer to neurology as requested.

## 2025-05-27 ENCOUNTER — PRIOR AUTHORIZATION (OUTPATIENT)
Dept: FAMILY MEDICINE CLINIC | Facility: CLINIC | Age: 65
End: 2025-05-27
Payer: COMMERCIAL

## 2025-05-27 ENCOUNTER — TELEPHONE (OUTPATIENT)
Dept: FAMILY MEDICINE CLINIC | Facility: CLINIC | Age: 65
End: 2025-05-27
Payer: COMMERCIAL

## 2025-05-27 NOTE — TELEPHONE ENCOUNTER
[employer] would not work due to the pt address on file not being current. I called the number on the back on the card [phone]  PA# [id number]    Waiting for clinical specialist to call back with further questions or waiting for a determination letter with next steps.

## 2025-05-27 NOTE — TELEPHONE ENCOUNTER
Fax notification received that PA has been started for Repatha SureClick 140MG autoinjector   Key ID: [id number]

## 2025-06-16 ENCOUNTER — TELEPHONE (OUTPATIENT)
Dept: CARDIOLOGY | Facility: CLINIC | Age: 65
End: 2025-06-16
Payer: COMMERCIAL

## 2025-06-16 ENCOUNTER — OFFICE VISIT (OUTPATIENT)
Dept: CARDIOLOGY | Facility: CLINIC | Age: 65
End: 2025-06-16
Payer: COMMERCIAL

## 2025-06-16 VITALS
DIASTOLIC BLOOD PRESSURE: 70 MMHG | BODY MASS INDEX: 29.09 KG/M2 | OXYGEN SATURATION: 96 % | WEIGHT: 203.2 LBS | HEIGHT: 70 IN | SYSTOLIC BLOOD PRESSURE: 130 MMHG | HEART RATE: 69 BPM

## 2025-06-16 DIAGNOSIS — E78.00 PURE HYPERCHOLESTEROLEMIA: ICD-10-CM

## 2025-06-16 DIAGNOSIS — I25.10 CORONARY ARTERY CALCIFICATION OF NATIVE ARTERY: Primary | ICD-10-CM

## 2025-06-16 DIAGNOSIS — I25.84 CORONARY ARTERY CALCIFICATION OF NATIVE ARTERY: Primary | ICD-10-CM

## 2025-06-16 DIAGNOSIS — I10 ESSENTIAL HYPERTENSION: ICD-10-CM

## 2025-06-16 PROCEDURE — 99204 OFFICE O/P NEW MOD 45 MIN: CPT | Performed by: INTERNAL MEDICINE

## 2025-06-16 NOTE — PROGRESS NOTES
[facility] Cardiology  Consultation H&P  Abel Munoz  1960    There is no work phone number on file..    VISIT DATE:  06/16/2025    PCP: Chong Richardson MD  [address] [address]    CC:  Chief Complaint   Patient presents with    Establish Care    Hyperlipidemia       Previous cardiac studies and procedures:  January 2023 , 61st percentile.    November 2023 stress echocardiogram  1. Stress echocardiogram negative for exercise-induced wall motion abnormalities.   2. Ejection fraction response from 61% at rest to 76% at peak stress.   3. Peak heart rate was 150 BPM, which was 96% of the maximum predicted heart rate.   4. The patient achieved a workload of 12.5 METS and 124% FAC.   5. The patient exercised for 11:31 min:sec on the Cruz protocol.   6. The stress ECG was negative for ischemia.   7. Negative for chest pain.     ASSESSMENT:   Diagnosis Plan   1. Coronary artery calcification of native artery        2. Essential hypertension        3. Pure hypercholesterolemia              PLAN:  Coronary atherosclerosis: Nonobstructive.  Intolerant to statin use.  Recommending PCSK9 inhibitor.  Goal LDL less than 100, ideally less than 70.  Continue regular exercise and a heart healthy, prominent plant-based diet.  Afterload well-controlled.    History of Present Illness   65-year-old gentleman with history of hypertension, dyslipidemia, coronary calcification and family history of coronary disease.  Blood pressures running less than 130/80 mmHg.  Denies chest discomfort, palpitations or dyspnea.  Maintaining an active lifestyle without limitation currently.  Compliant with medical therapy.  Non-smoker.  Underwent coronary calcium scoring in early 2023, results placed him in the 61st percentile.  Started on low-dose rosuvastatin, 10 mg p.o. daily.  Developed diffuse probably lower extremity myalgias and intermittent exercise-induced muscle  "cramps along with generalized fatigue.  Symptoms gradually resolved off medical therapy.  Reviewed most recent CT chest which revealed dense calcifications in the proximal LAD.    PHYSICAL EXAMINATION:  Vitals:    06/16/25 0851   BP: 130/70   BP Location: Right arm   Patient Position: Sitting   Cuff Size: Adult   Pulse: 69   SpO2: 96%   Weight: 92.2 kg (203 lb 3.2 oz)   Height: 177.8 cm (70\")     General Appearance:    Alert, cooperative, no distress, appears stated age   Head:    Normocephalic, without obvious abnormality, atraumatic   Eyes:    conjunctiva/corneas clear, EOM's intact, fundi     benign, both eyes   Ears:    Normal TM's and external ear canals, both ears   Nose:   Nares normal, septum midline, mucosa normal, no drainage    or sinus tenderness   Throat:   Lips, mucosa, and tongue normal; teeth and gums normal   Neck:   Supple, symmetrical, trachea midline, no adenopathy;     thyroid:  no enlargement/tenderness/nodules; no carotid    bruit or JVD   Back:     Symmetric, no curvature, ROM normal, no CVA tenderness   Lungs:     Clear to auscultation bilaterally, respirations unlabored   Chest Wall:    No tenderness or deformity    Heart:    Regular rate and rhythm, S1 and S2 normal, no murmur, rub   or gallop, normal carotid impulse bilaterally without bruit.   Abdomen:     Soft, non-tender, bowel sounds active all four quadrants,     no masses, no organomegaly   Extremities:   Extremities normal, atraumatic, no cyanosis or edema   Pulses:   2+ and symmetric all extremities   Skin:   Skin color, texture, turgor normal, no rashes or lesions   Lymph nodes:   Cervical, supraclavicular, and axillary nodes normal   Neurologic:   normal strength, sensation intact     throughout       Diagnostic Data:  Procedures  No results found for: \"CHLPL\", \"TRIG\", \"HDL\", \"LDLDIRECT\"  Lab Results   Component Value Date    GLUCOSE 75 06/14/2023    BUN 26 (H) 06/14/2023    CREATININE 1.02 06/14/2023     06/14/2023    K " "4.2 06/14/2023     06/14/2023    CO2 24.4 06/14/2023     No results found for: \"HGBA1C\"  Lab Results   Component Value Date    WBC 5.0 11/14/2024    HGB 14.1 11/14/2024    HCT 39.9 11/14/2024     11/14/2024       PROBLEM LIST:  Patient Active Problem List   Diagnosis    Neuroforaminal stenosis of spine, right L5-S1    Facet arthropathy, cervical    Facet arthropathy, thoracic    Myofascial pain    Glenohumeral arthritis    Osteoarthritis of spine with radiculopathy, lumbar region.  Right L5-S1    Anxiety    Lumbar disc herniation    Peripheral polyneuropathy    History of fusion of cervical spine, ACDF C6-C7    Insomnia    Back muscle spasm    Klippel-Feil sequence    Essential hypertension    Laryngeal ulceration    Radiculopathy, cervical region    Lower extremity pain, bilateral    Gastroesophageal reflux disease    Sleep-wake schedule disorder, advanced phase type    Lipoma of torso    Ear pain, bilateral    Mass of soft tissue of chest    Epigastric pain    RUQ pain    Generalized abdominal pain    Acute left-sided thoracic back pain    Chest pain    Acute left flank pain    Statin myopathy    Hyperlipidemia    Coronary artery calcification of native artery       PAST MEDICAL HX  Past Medical History:   Diagnosis Date    Acute serous otitis media     Anxiety 2023    cymbalta - all good    Back pain     Upper Back    Back spasm     Cervical facet syndrome     Difficulty walking 2023    when on statin    Diverticulosis 2012    GERD (gastroesophageal reflux disease) 2000    History of medical problems 2023    allergic to statins    History of migraine headaches     History of paronychia of finger     History of pharyngitis     History of serous otitis media     History of upper respiratory infection     Hyperlipidemia 2022    candidate for Repatha    Hypertension 2022    under control    Lateral epicondylitis of left elbow     Low back pain 2013    comes and goes    Neuromuscular disorder 2023    no " diagnosis yet - below knees    Peripheral neuropathy 2010    below knee    Radicular pain of thoracic region     Right shoulder pain     Scoliosis 2017    less than 10%    Shin splint     Sleep apnea 2018    CPAP - all good    Stye        Allergies  Allergies   Allergen Reactions    Crestor [Rosuvastatin] Myalgia    Ondansetron Hcl GI Intolerance     constipation       Current Medications    Current Outpatient Medications:     celecoxib (CeleBREX) 200 MG capsule, Take 1 capsule by mouth Daily., Disp: , Rfl:     DULoxetine (CYMBALTA) 30 MG capsule, Take 1 capsule by mouth Daily., Disp: , Rfl:     Evolocumab (REPATHA) solution auto-injector SureClick injection, Inject 1 mL under the skin into the appropriate area as directed Every 14 (Fourteen) Days., Disp: 6 mL, Rfl: 0    gabapentin (NEURONTIN) 300 MG capsule, Take 2 capsules by mouth every night at bedtime., Disp: , Rfl:     lisinopril (PRINIVIL,ZESTRIL) 40 MG tablet, Take 1 tablet by mouth Daily., Disp: , Rfl:     omeprazole (priLOSEC) 20 MG capsule, Take 1 capsule by mouth 2 (Two) Times a Day., Disp: 180 capsule, Rfl: 2    VIAGRA 100 MG tablet, TAKE AS DIRECTED, Disp: 9 tablet, Rfl: 2    zolpidem (AMBIEN) 10 MG tablet, Take 1 tablet by mouth Every Night., Disp: 90 tablet, Rfl: 1         ROS  ROS      SOCIAL HX  Social History     Socioeconomic History    Marital status:      Spouse name: Meghan   Tobacco Use    Smoking status: Never     Passive exposure: Never    Smokeless tobacco: Former     Types: Snuff   Vaping Use    Vaping status: Never Used   Substance and Sexual Activity    Alcohol use: Yes     Alcohol/week: 8.0 standard drinks of alcohol     Types: 5 Glasses of wine, 3 Cans of beer per week     Comment: reduced    Drug use: Never    Sexual activity: Yes     Partners: Female       FAMILY HX  Family History   Problem Relation Age of Onset    Heart disease Mother     Heart attack Mother     Heart disease Father     Heart attack Father     Dementia  Maternal Grandmother     No Known Problems Maternal Grandfather     Dementia Paternal Grandmother     No Known Problems Paternal Grandfather     No Known Problems Sister     No Known Problems Brother     No Known Problems Brother              Freddy Boo III, MD, FACC

## 2025-06-16 NOTE — TELEPHONE ENCOUNTER
----- Message from Cara ROTHMAN sent at 6/16/2025  9:40 AM EDT -----  Per       Recommending PCSK9 inhibitor.        Thanks

## 2025-06-17 NOTE — TELEPHONE ENCOUNTER
Had to call for PA since CMM wasn't recognizing insurance. Waiting on outcome.     Prior authorization initiated by [facility].  Update will be provided when a determination has been received.     Medication: Repatha  PA Submission Method: Phone  Case Number/CMM Key: PA case # [id number]

## 2025-06-18 ENCOUNTER — SPECIALTY PHARMACY (OUTPATIENT)
Dept: CARDIOLOGY | Facility: CLINIC | Age: 65
End: 2025-06-18
Payer: COMMERCIAL

## 2025-06-18 NOTE — TELEPHONE ENCOUNTER
Pt approved for Repatha but had to call to cancel script at [facility] so we could enroll him and get him a copay card. Tanika will send a new script that will be 340B and let us bring his copay down to $15/mo. We will call him and get him started.

## 2025-06-23 ENCOUNTER — SPECIALTY PHARMACY (OUTPATIENT)
Facility: HOSPITAL | Age: 65
End: 2025-06-23
Payer: COMMERCIAL

## 2025-06-23 DIAGNOSIS — E78.5 HYPERLIPIDEMIA, UNSPECIFIED HYPERLIPIDEMIA TYPE: ICD-10-CM

## 2025-06-23 NOTE — PROGRESS NOTES
Specialty Pharmacy Patient Management Program  Cardiology Initial Assessment     Abel Munoz was referred by a Cardiology provider to the Cardiology Patient Management program offered by [facility] for Hyperlipidemia on 06/23/25.  An initial outreach was conducted, including assessment of therapy appropriateness and specialty medication education for Repatha. The patient was introduced to services offered by [facility], including: regular assessments, refill coordination, mail order delivery options, prior authorization maintenance, and financial assistance programs as applicable. The patient was also provided with contact information for the pharmacy team.     Insurance Coverage & Financial Support  [employer]     Relevant Past Medical History and Comorbidities  Relevant medical history and concomitant health conditions were discussed with the patient. The patient's chart has been reviewed for relevant past medical history and comorbid conditions and updated as necessary.  Past Medical History:   Diagnosis Date    Acute serous otitis media     Anxiety 2023    cymbalta - all good    Back pain     Upper Back    Back spasm     Cervical facet syndrome     Difficulty walking 2023    when on statin    Diverticulosis 2012    GERD (gastroesophageal reflux disease) 2000    History of medical problems 2023    allergic to statins    History of migraine headaches     History of paronychia of finger     History of pharyngitis     History of serous otitis media     History of upper respiratory infection     Hyperlipidemia 2022    candidate for Repatha    Hypertension 2022    under control    Lateral epicondylitis of left elbow     Low back pain 2013    comes and goes    Neuromuscular disorder 2023    no diagnosis yet - below knees    Peripheral neuropathy 2010    below knee    Radicular pain of thoracic region     Right shoulder pain     Scoliosis 2017    less  "than 10%    Shin splint     Sleep apnea 2018    CPAP - all good    Stye      Social History     Socioeconomic History    Marital status:      Spouse name: Meghan   Tobacco Use    Smoking status: Never     Passive exposure: Never    Smokeless tobacco: Former     Types: Snuff   Vaping Use    Vaping status: Never Used   Substance and Sexual Activity    Alcohol use: Yes     Alcohol/week: 8.0 standard drinks of alcohol     Types: 5 Glasses of wine, 3 Cans of beer per week     Comment: reduced    Drug use: Never    Sexual activity: Yes     Partners: Female       Problem list reviewed by Tanika Garcia, PharmD on 6/23/2025 at  9:20 AM    Allergies  Known allergies and reactions were discussed with the patient. The patient's chart has been reviewed for  allergy information and updated as necessary.   Allergies   Allergen Reactions    Crestor [Rosuvastatin] Myalgia    Ondansetron Hcl GI Intolerance     constipation       Allergies reviewed by Tanika Garcia, PharmD on 6/23/2025 at  9:20 AM    Relevant Laboratory Values  Relevant laboratory values were discussed with the patient. The following specialty medication dose adjustment(s) are recommended: N/A    Lab Results   Component Value Date    GLUCOSE 75 06/14/2023    CALCIUM 9.5 06/14/2023     06/14/2023    K 4.2 06/14/2023    CO2 24.4 06/14/2023     06/14/2023    BUN 26 (H) 06/14/2023    CREATININE 1.02 06/14/2023    EGFRIFNONA 90 09/11/2019    BCR 25.5 (H) 06/14/2023    ANIONGAP 11.6 06/14/2023     No results found for: \"CHOL\", \"CHLPL\", \"TRIG\", \"HDL\", \"LDL\", \"LDLDIRECT\"      Current Medication List  This medication list has been reviewed with the patient and evaluated for any interactions or necessary modifications/recommendations, and updated to include all prescription medications, OTC medications, and supplements the patient is currently taking.  This list reflects what is contained in the patient's profile, which has also been marked as reviewed " to communicate to other providers it is the most up to date version of the patient's current medication therapy.     Current Outpatient Medications:     Evolocumab (REPATHA) solution auto-injector SureClick injection, Inject 1 mL under the skin into the appropriate area as directed Every 14 (Fourteen) Days., Disp: 6 mL, Rfl: 4    celecoxib (CeleBREX) 200 MG capsule, Take 1 capsule by mouth Daily., Disp: , Rfl:     DULoxetine (CYMBALTA) 30 MG capsule, Take 1 capsule by mouth Daily., Disp: , Rfl:     gabapentin (NEURONTIN) 300 MG capsule, Take 2 capsules by mouth every night at bedtime., Disp: , Rfl:     lisinopril (PRINIVIL,ZESTRIL) 40 MG tablet, Take 1 tablet by mouth Daily., Disp: , Rfl:     omeprazole (priLOSEC) 20 MG capsule, Take 1 capsule by mouth 2 (Two) Times a Day., Disp: 180 capsule, Rfl: 2    VIAGRA 100 MG tablet, TAKE AS DIRECTED, Disp: 9 tablet, Rfl: 2    zolpidem (AMBIEN) 10 MG tablet, Take 1 tablet by mouth Every Night., Disp: 90 tablet, Rfl: 1    Medicines reviewed by Tanika Garcia, PharmD on 6/23/2025 at  9:20 AM    Drug Interactions  None    Initial Education Provided for Specialty Medication  The patient has been provided with the following education and any applicable administration techniques (i.e. self-injection) have been demonstrated for the therapies indicated. All questions and concerns have been addressed prior to the patient receiving the medication, and the patient has verbalized comprehension of the education and any materials provided. Additional patient education shall be provided and documented upon request by the patient, provider, or payer.    REPATHA® (evolocumab)  Medication Expectations   Why am I taking this medication? You are taking Repatha to lower your “bad” cholesterol (LDL-C). This medication can be used in adults with high blood cholesterol including primary hyperlipidemia and familial hypercholesterolemia.    What should I expect while on this medication? You should  expect to see your cholesterol improve over time. Specifically, you should see your LDL-C decrease.    How does the medication work? Repatha works by blocking a protein called PCSK9 that contributes to high levels of bad cholesterol. It helps increase your liver's ability to remove bad cholesterol from your blood.     How long will I be on this medication for? The amount of time you will be on this medication will be determined by your doctor based on your cholesterol and/or your risk of having a cardiac event. You will most likely be on this medication or another cholesterol medication throughout your lifetime. Do not abruptly stop this medication without talking to your doctor first.    How do I take this medication? Take as directed on your prescription label. Repatha is injected under the skin (subcutaneously) of your stomach, thigh, or upper arm. This medication is usually given one or twice a month.   What are some possible side effects? The most common side effects of Repatha include redness, itching, swelling, or pain/tenderness at the injection site, symptoms of the common cold, flu or flu-like symptoms or back pain.    What happens if I miss a dose? If you miss a dose, take it as soon as you remember if it is within 7 days from the usual day of administration then resume your original schedule. If it is beyond 7 days and you use the dale-2-week dose, skip the missed dose and resume your normal dosing schedule.If it is beyond 7 days and you use the once-monthly dose, inject the dose and start a new schedule based on that date.      Medication Safety   What are things I should warn my doctor immediately about? Talk to your doctor if you are pregnant, planning to become pregnant, or breastfeeding. Stop the medication and tell your doctor or seek emergency medical help if you notice any signs/symptoms of an allergic reaction (severe rash, redness, hives, severe itching, trouble breathing, or swelling of the  face, lips, or tongue). If you have a rubber or latex allergy, you should not use the Repatha SureClick® Autoinjector pen or the prefilled syringe, please notify your doctor or pharmacist.   What are things that I should be cautious of? Be cautious of any side effects from this medication. Talk to your doctor if any new ones develop or aren't getting better.   What are some medications that can interact with this one? There are no known significant drug interactions with Repatha. Always tell your doctor or pharmacist immediately if you start taking any new medications, including over-the-counter medications, vitamins, and herbal supplements.      Medication Storage/Handling   How should I handle this medication? Do not shake or expose the pens, cartridges, or syringes to extreme heat or direct sunlight. Keep this medication out of reach of pets/children. Allow medication to warm at room temperature prior to administration.   How does this medication need to be stored? Store unused pens, cartridges, or syringes in the refrigerator in the original cartons to protect from light. If needed, Repatha may be kept at room temperature in the original carton for up to 30 days. Do not freeze.    How should I dispose of this medication? All the Repatha devices are single-dose and should be discarded in a sharps container after use. If your doctor decides to stop this medication, take to your local police station for proper disposal. Some pharmacies also have take-back bins for medication drop-off.      Resources/Support   How can I remind myself to take this medication? You can download reminder apps to help you manage your refills. You may also set an alarm on your phone to remind you to take your dose.    Is financial support available?  [employer] can provide co-pay cards if you have commercial insurance or patient assistance if you have Medicare or no insurance.    Which vaccines are recommended for me? Talk to your doctor about  "these vaccines: Flu, Coronavirus (COVID-19), Pneumococcal (pneumonia), Tdap, Hepatitis B, Zoster (shingles)          Adherence and Self-Administration  Adherence related to the patient's specialty therapy was discussed with the patient. The Adherence segment of this outreach has been reviewed and updated.     Is there a concern with patient's ability to self administer the medication correctly and without issue?: No  Were any potential barriers to adherence identified during the initial assessment or patient education?: No  Are there any concerns regarding the patient's understanding of the importance of medication adherence?: No  Methods for Supporting Patient Adherence and/or Self-Administration: None    Open Medication Therapy Problems  No medication therapy recommendations to display    Goals of Therapy  Goals related to the patient's specialty therapy were discussed with the patient. The Patient Goals segment of this outreach has been reviewed and updated.   Goals Addressed Today        Specialty Pharmacy General Goal      LDL Goal < 100 mg/dL    No results found for: \"LDL\"               Reassessment Plan & Follow-Up  1. Medication Therapy Changes: Continue Repatha 140mg subcutaneous every 14 days   2. Related Plans, Therapy Recommendations, or Therapy Problems to Be Addressed: None  3. Pharmacist to perform regular assessments no more than (6) months from the previous assessment.  4. Care Coordinator to set up future refill outreaches, coordinate prescription delivery, and escalate clinical questions to pharmacist.  5. Welcome information and patient satisfaction survey to be sent by specialty pharmacy team with patient's initial fill.    Attestation  Therapeutic appropriateness: Appropriate   I attest the patient was actively involved in and has agreed to the above plan of care. If the prescribed therapy is at any point deemed not appropriate based on the current or future assessments, a consultation will be " initiated with the patient's specialty care provider to determine the best course of action. The revised plan of therapy will be documented along with any required assessments and/or additional patient education provided.     Tanika Garcia, PharmD, BCPS  Clinical Specialty Pharmacist, Cardiology  6/23/2025  09:21 EDT

## 2025-08-04 ENCOUNTER — OFFICE VISIT (OUTPATIENT)
Dept: FAMILY MEDICINE CLINIC | Facility: CLINIC | Age: 65
End: 2025-08-04
Payer: COMMERCIAL

## 2025-08-04 VITALS
BODY MASS INDEX: 29.06 KG/M2 | DIASTOLIC BLOOD PRESSURE: 86 MMHG | HEIGHT: 70 IN | OXYGEN SATURATION: 99 % | HEART RATE: 70 BPM | WEIGHT: 203 LBS | TEMPERATURE: 97.8 F | SYSTOLIC BLOOD PRESSURE: 140 MMHG

## 2025-08-04 DIAGNOSIS — R09.82 PND (POST-NASAL DRIP): ICD-10-CM

## 2025-08-04 DIAGNOSIS — R11.0 NAUSEA: ICD-10-CM

## 2025-08-04 DIAGNOSIS — J02.9 SORE THROAT: Primary | ICD-10-CM

## 2025-08-04 LAB
EXPIRATION DATE: NORMAL
FLUAV AG NPH QL: NEGATIVE
FLUBV AG NPH QL: NEGATIVE
INTERNAL CONTROL: NORMAL
Lab: NORMAL
S PYO AG THROAT QL: NEGATIVE
SARS-COV-2 AG UPPER RESP QL IA.RAPID: NOT DETECTED

## 2025-08-04 PROCEDURE — 87804 INFLUENZA ASSAY W/OPTIC: CPT | Performed by: FAMILY MEDICINE

## 2025-08-04 PROCEDURE — 87880 STREP A ASSAY W/OPTIC: CPT | Performed by: FAMILY MEDICINE

## 2025-08-04 PROCEDURE — 99213 OFFICE O/P EST LOW 20 MIN: CPT | Performed by: FAMILY MEDICINE

## 2025-08-04 PROCEDURE — 87426 SARSCOV CORONAVIRUS AG IA: CPT | Performed by: FAMILY MEDICINE
